# Patient Record
Sex: FEMALE | Race: WHITE | NOT HISPANIC OR LATINO | Employment: OTHER | URBAN - METROPOLITAN AREA
[De-identification: names, ages, dates, MRNs, and addresses within clinical notes are randomized per-mention and may not be internally consistent; named-entity substitution may affect disease eponyms.]

---

## 2023-04-13 LAB
ANION GAP IN SER/PLAS: 10 MMOL/L (ref 10–20)
CALCIUM (MG/DL) IN SER/PLAS: 9.4 MG/DL (ref 8.6–10.3)
CARBON DIOXIDE, TOTAL (MMOL/L) IN SER/PLAS: 28 MMOL/L (ref 21–32)
CHLORIDE (MMOL/L) IN SER/PLAS: 102 MMOL/L (ref 98–107)
CREATININE (MG/DL) IN SER/PLAS: 0.61 MG/DL (ref 0.5–1.05)
GFR FEMALE: 90 ML/MIN/1.73M2
GLUCOSE (MG/DL) IN SER/PLAS: 83 MG/DL (ref 74–99)
POTASSIUM (MMOL/L) IN SER/PLAS: 4.1 MMOL/L (ref 3.5–5.3)
SODIUM (MMOL/L) IN SER/PLAS: 136 MMOL/L (ref 136–145)
THYROTROPIN (MIU/L) IN SER/PLAS BY DETECTION LIMIT <= 0.05 MIU/L: 1.22 MIU/L (ref 0.44–3.98)
THYROXINE (T4) FREE (NG/DL) IN SER/PLAS: 1.3 NG/DL (ref 0.61–1.12)
TRIIODOTHYRONINE (T3) FREE (PG/ML) IN SER/PLAS: 3.8 PG/ML (ref 2.3–4.2)
UREA NITROGEN (MG/DL) IN SER/PLAS: 16 MG/DL (ref 6–23)

## 2023-08-16 LAB
ALANINE AMINOTRANSFERASE (SGPT) (U/L) IN SER/PLAS: 12 U/L (ref 7–45)
ALBUMIN (G/DL) IN SER/PLAS: 3.9 G/DL (ref 3.4–5)
ALKALINE PHOSPHATASE (U/L) IN SER/PLAS: 48 U/L (ref 33–136)
ANION GAP IN SER/PLAS: 10 MMOL/L (ref 10–20)
ASPARTATE AMINOTRANSFERASE (SGOT) (U/L) IN SER/PLAS: 14 U/L (ref 9–39)
BASOPHILS (10*3/UL) IN BLOOD BY AUTOMATED COUNT: 0.08 X10E9/L (ref 0–0.1)
BASOPHILS/100 LEUKOCYTES IN BLOOD BY AUTOMATED COUNT: 1.4 % (ref 0–2)
BILIRUBIN TOTAL (MG/DL) IN SER/PLAS: 1.1 MG/DL (ref 0–1.2)
CALCIUM (MG/DL) IN SER/PLAS: 9.2 MG/DL (ref 8.6–10.3)
CARBON DIOXIDE, TOTAL (MMOL/L) IN SER/PLAS: 28 MMOL/L (ref 21–32)
CHLORIDE (MMOL/L) IN SER/PLAS: 101 MMOL/L (ref 98–107)
CREATININE (MG/DL) IN SER/PLAS: 0.57 MG/DL (ref 0.5–1.05)
EOSINOPHILS (10*3/UL) IN BLOOD BY AUTOMATED COUNT: 0.16 X10E9/L (ref 0–0.4)
EOSINOPHILS/100 LEUKOCYTES IN BLOOD BY AUTOMATED COUNT: 2.7 % (ref 0–6)
ERYTHROCYTE DISTRIBUTION WIDTH (RATIO) BY AUTOMATED COUNT: 13.2 % (ref 11.5–14.5)
ERYTHROCYTE MEAN CORPUSCULAR HEMOGLOBIN CONCENTRATION (G/DL) BY AUTOMATED: 32.6 G/DL (ref 32–36)
ERYTHROCYTE MEAN CORPUSCULAR VOLUME (FL) BY AUTOMATED COUNT: 99 FL (ref 80–100)
ERYTHROCYTES (10*6/UL) IN BLOOD BY AUTOMATED COUNT: 3.92 X10E12/L (ref 4–5.2)
GFR FEMALE: >90 ML/MIN/1.73M2
GLUCOSE (MG/DL) IN SER/PLAS: 81 MG/DL (ref 74–99)
HEMATOCRIT (%) IN BLOOD BY AUTOMATED COUNT: 38.7 % (ref 36–46)
HEMOGLOBIN (G/DL) IN BLOOD: 12.6 G/DL (ref 12–16)
IMMATURE GRANULOCYTES/100 LEUKOCYTES IN BLOOD BY AUTOMATED COUNT: 0.3 % (ref 0–0.9)
LEUKOCYTES (10*3/UL) IN BLOOD BY AUTOMATED COUNT: 5.8 X10E9/L (ref 4.4–11.3)
LYMPHOCYTES (10*3/UL) IN BLOOD BY AUTOMATED COUNT: 0.6 X10E9/L (ref 0.8–3)
LYMPHOCYTES/100 LEUKOCYTES IN BLOOD BY AUTOMATED COUNT: 10.3 % (ref 13–44)
MONOCYTES (10*3/UL) IN BLOOD BY AUTOMATED COUNT: 0.74 X10E9/L (ref 0.05–0.8)
MONOCYTES/100 LEUKOCYTES IN BLOOD BY AUTOMATED COUNT: 12.7 % (ref 2–10)
NEUTROPHILS (10*3/UL) IN BLOOD BY AUTOMATED COUNT: 4.22 X10E9/L (ref 1.6–5.5)
NEUTROPHILS/100 LEUKOCYTES IN BLOOD BY AUTOMATED COUNT: 72.6 % (ref 40–80)
PLATELETS (10*3/UL) IN BLOOD AUTOMATED COUNT: 312 X10E9/L (ref 150–450)
POTASSIUM (MMOL/L) IN SER/PLAS: 4.3 MMOL/L (ref 3.5–5.3)
PROTEIN TOTAL: 6.2 G/DL (ref 6.4–8.2)
SODIUM (MMOL/L) IN SER/PLAS: 135 MMOL/L (ref 136–145)
THYROTROPIN (MIU/L) IN SER/PLAS BY DETECTION LIMIT <= 0.05 MIU/L: 1.99 MIU/L (ref 0.44–3.98)
UREA NITROGEN (MG/DL) IN SER/PLAS: 13 MG/DL (ref 6–23)

## 2023-10-26 ENCOUNTER — HOSPITAL ENCOUNTER (OUTPATIENT)
Dept: RADIOLOGY | Facility: HOSPITAL | Age: 83
Discharge: HOME | End: 2023-10-26
Payer: MEDICARE

## 2023-10-26 VITALS — WEIGHT: 133 LBS | BODY MASS INDEX: 26.81 KG/M2 | HEIGHT: 59 IN

## 2023-10-26 DIAGNOSIS — Z12.31 ENCOUNTER FOR SCREENING MAMMOGRAM FOR MALIGNANT NEOPLASM OF BREAST: ICD-10-CM

## 2023-10-26 PROCEDURE — 77067 SCR MAMMO BI INCL CAD: CPT

## 2023-10-26 PROCEDURE — 77063 BREAST TOMOSYNTHESIS BI: CPT | Performed by: RADIOLOGY

## 2023-10-26 PROCEDURE — 77067 SCR MAMMO BI INCL CAD: CPT | Performed by: RADIOLOGY

## 2023-11-28 ENCOUNTER — OFFICE VISIT (OUTPATIENT)
Dept: GASTROENTEROLOGY | Facility: CLINIC | Age: 83
End: 2023-11-28
Payer: MEDICARE

## 2023-11-28 VITALS
BODY MASS INDEX: 27.21 KG/M2 | DIASTOLIC BLOOD PRESSURE: 75 MMHG | SYSTOLIC BLOOD PRESSURE: 137 MMHG | HEIGHT: 59 IN | WEIGHT: 135 LBS | RESPIRATION RATE: 18 BRPM | TEMPERATURE: 96.9 F | OXYGEN SATURATION: 96 % | HEART RATE: 68 BPM

## 2023-11-28 DIAGNOSIS — K21.9 GASTROESOPHAGEAL REFLUX DISEASE, UNSPECIFIED WHETHER ESOPHAGITIS PRESENT: Primary | ICD-10-CM

## 2023-11-28 PROCEDURE — 1126F AMNT PAIN NOTED NONE PRSNT: CPT | Performed by: INTERNAL MEDICINE

## 2023-11-28 PROCEDURE — 99213 OFFICE O/P EST LOW 20 MIN: CPT | Performed by: INTERNAL MEDICINE

## 2023-11-28 PROCEDURE — 1159F MED LIST DOCD IN RCRD: CPT | Performed by: INTERNAL MEDICINE

## 2023-11-28 RX ORDER — LEVOTHYROXINE SODIUM 75 UG/1
88 TABLET ORAL
COMMUNITY

## 2023-11-28 RX ORDER — FAMOTIDINE 20 MG/1
20 TABLET, FILM COATED ORAL DAILY
COMMUNITY

## 2023-11-28 RX ORDER — DOCUSATE SODIUM 100 MG/1
100 CAPSULE, LIQUID FILLED ORAL AS NEEDED
COMMUNITY

## 2023-11-28 RX ORDER — TRAZODONE HYDROCHLORIDE 50 MG/1
50 TABLET ORAL NIGHTLY
COMMUNITY

## 2023-11-28 RX ORDER — ATORVASTATIN CALCIUM 10 MG/1
10 TABLET, FILM COATED ORAL DAILY
COMMUNITY

## 2023-11-28 RX ORDER — DENOSUMAB 60 MG/ML
60 INJECTION SUBCUTANEOUS
COMMUNITY

## 2023-11-28 RX ORDER — LOSARTAN POTASSIUM 25 MG/1
25 TABLET ORAL DAILY
COMMUNITY

## 2023-11-28 RX ORDER — OMEPRAZOLE 20 MG/1
20 CAPSULE, DELAYED RELEASE ORAL 2 TIMES DAILY
Qty: 180 CAPSULE | Refills: 3 | Status: SHIPPED | OUTPATIENT
Start: 2023-11-28 | End: 2024-01-09 | Stop reason: SDUPTHER

## 2023-11-28 RX ORDER — ASPIRIN 81 MG/1
81 TABLET ORAL DAILY
COMMUNITY

## 2023-11-28 RX ORDER — OMEPRAZOLE 20 MG/1
20 CAPSULE, DELAYED RELEASE ORAL
COMMUNITY
End: 2023-11-28 | Stop reason: SDUPTHER

## 2023-11-28 ASSESSMENT — ENCOUNTER SYMPTOMS
BLOATING: 0
CHILLS: 0
NAUSEA: 0
HEARTBURN: 1
DYSPNEA ON EXERTION: 0
CONSTIPATION: 0
SHORTNESS OF BREATH: 0
HEMATOCHEZIA: 0
FEVER: 0
WEIGHT LOSS: 0
ABDOMINAL PAIN: 0
DIARRHEA: 0
VOMITING: 0
COUGH: 0

## 2023-11-28 NOTE — PATIENT INSTRUCTIONS
I would like you to increase the omeprazole to 20 mg twice a day you can eventually decrease it back down to daily if your symptoms resolve.  Continue to monitor for any breakthrough heartburn symptoms.  As far as the constipation is concerned you can continue the Linzess 290 mcg tablets daily as you are doing.  I will see you in follow-up in 6 months

## 2023-11-28 NOTE — PROGRESS NOTES
REASON FOR VISIT:  Constipation and heartburn     HPI:  Christina Price is a 83 y.o. female who presents for follow up     f/u chronic constipation and GERD--doing well  has been on 290 of Linzess--moves her jaclyn 1-2 x daily -  diarrhea  if she eats too close to dosage  , occasional straining - will take colace at bedtime   Is taking omeprazole 20 mg once a day in the morning - she missed a few doses - improved with taking it again  She takes famotidine at bedtime  no alarm sx - occ epigastric pain - is trying to cut down on coffee          Med list notable for    Labs notable for    No fhx of CRC.       Prev endoscopic eval:     REVIEW OF SYSTEMS    Review of Systems   Constitutional: Negative for chills, fever and weight loss.   Cardiovascular:  Negative for chest pain and dyspnea on exertion.   Respiratory:  Negative for cough and shortness of breath.    Gastrointestinal:  Positive for heartburn. Negative for bloating, abdominal pain, constipation, diarrhea, dysphagia, hematochezia, nausea and vomiting.          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/2018    History of neurofibromatosis    Other conditions influencing health status     Arthritis    Personal history of other diseases of the circulatory system     History of essential hypertension    Personal history of other diseases of the musculoskeletal system and connective tissue     History of arthritis    Polyp of colon     Polyp of sigmoid colon    Unspecified visual loss     Vision problem       Past Surgical History:   Procedure Laterality Date    APPENDECTOMY  11/05/2013    Appendectomy    COLONOSCOPY  11/05/2013    Complete Colonoscopy    MASTECTOMY, PARTIAL  1985    Breast Surgery Partial Mastectomy    OTHER SURGICAL HISTORY  11/05/2013    Upper Gastrointestinal Endoscopy (Therapeutic)    OTHER SURGICAL HISTORY Right 1985    breast reduction     TOTAL KNEE ARTHROPLASTY  11/05/2013    Knee Replacement       No family history on file.    Social History     Tobacco Use    Smoking status: Unknown    Smokeless tobacco: Not on file   Substance Use Topics    Alcohol use: Not on file       No current outpatient medications on file.     No current facility-administered medications for this visit.       PHYSICAL EXAM:  There were no vitals taken for this visit.     Physical Exam  Constitutional:       Comments: Awake   HENT:      Head: Normocephalic.   Cardiovascular:      Rate and Rhythm: Normal rate and regular rhythm.   Pulmonary:      Effort: Pulmonary effort is normal.      Breath sounds: Normal breath sounds.   Abdominal:      General: Bowel sounds are normal.      Palpations: Abdomen is soft.   Neurological:      Mental Status: She is alert.   Psychiatric:         Mood and Affect: Mood normal.        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  She been using Linzess to 290 mcg tablets daily and has been managing her bowel movements.  She stopped taking the omeprazole due to some missed doses and noted recurrent symptoms.  She has been having more heartburn but has been using the omeprazole in the morning and famotidine at nighttime.  I will increase her omeprazole to 20 mg twice daily and she can continue to use the famotidine as needed.  I have given her samples for the Linzess due to her insurance issues getting into the donut hole.  I will see her in follow-up in 6 months        Evaluation requested by Dr. Ella Ch MD.   My final recommendations will be communicated back to the requesting physician by way of shared Medical record or letter to requesting physician via fax.      Attending Note:   I have personally performed a face to face assessment of this Patient, which included an interview, physical exam and Assessment and Plan.  I have reviewed and confirmed the  history and key findings as documented by the Medical Assistant and edited as appropriate.     Signature: Nina Mccauley MD    Date: 11/28/2023  Time: 8:01 AM

## 2023-12-26 ENCOUNTER — HOSPITAL ENCOUNTER (OUTPATIENT)
Dept: RADIOLOGY | Facility: HOSPITAL | Age: 83
Discharge: HOME | End: 2023-12-26
Payer: MEDICARE

## 2023-12-26 DIAGNOSIS — R91.8 OTHER NONSPECIFIC ABNORMAL FINDING OF LUNG FIELD: ICD-10-CM

## 2023-12-26 PROCEDURE — 71250 CT THORAX DX C-: CPT

## 2023-12-26 PROCEDURE — 71250 CT THORAX DX C-: CPT | Performed by: RADIOLOGY

## 2024-01-09 DIAGNOSIS — K21.9 GASTROESOPHAGEAL REFLUX DISEASE, UNSPECIFIED WHETHER ESOPHAGITIS PRESENT: ICD-10-CM

## 2024-01-09 RX ORDER — OMEPRAZOLE 20 MG/1
20 CAPSULE, DELAYED RELEASE ORAL 2 TIMES DAILY
Qty: 180 CAPSULE | Refills: 3 | Status: SHIPPED | OUTPATIENT
Start: 2024-01-09

## 2024-01-25 ENCOUNTER — TELEPHONE (OUTPATIENT)
Dept: GASTROENTEROLOGY | Facility: CLINIC | Age: 84
End: 2024-01-25
Payer: MEDICARE

## 2024-01-25 DIAGNOSIS — K59.09 CHRONIC CONSTIPATION: Primary | ICD-10-CM

## 2024-01-25 DIAGNOSIS — K59.09 CHRONIC CONSTIPATION: ICD-10-CM

## 2024-01-25 NOTE — PROGRESS NOTES
Refilled patient's prescription for Linzess 290 mcg-quantity of 90 with 3 refills printed and will fax to the patient's pharmacy.

## 2024-01-25 NOTE — TELEPHONE ENCOUNTER
Ruperto Hernandez,     Patient will need this script printed and faxed to [facility]. If you can sign off on it I will see if Jade can fax it.

## 2024-02-01 DIAGNOSIS — K59.09 CHRONIC CONSTIPATION: ICD-10-CM

## 2024-02-01 NOTE — TELEPHONE ENCOUNTER
Patient's son called today (2/1/24) asking that Rx for Linzess be faxed to [facility]:  [phone]

## 2024-05-03 ENCOUNTER — LAB (OUTPATIENT)
Dept: LAB | Facility: LAB | Age: 84
End: 2024-05-03
Payer: COMMERCIAL

## 2024-05-03 DIAGNOSIS — E78.2 MIXED HYPERLIPIDEMIA: ICD-10-CM

## 2024-05-03 DIAGNOSIS — I10 ESSENTIAL (PRIMARY) HYPERTENSION: ICD-10-CM

## 2024-05-03 DIAGNOSIS — E03.9 HYPOTHYROIDISM, UNSPECIFIED: ICD-10-CM

## 2024-05-03 DIAGNOSIS — I10 ESSENTIAL (PRIMARY) HYPERTENSION: Primary | ICD-10-CM

## 2024-05-03 LAB
ANION GAP SERPL CALC-SCNC: 12 MMOL/L (ref 10–20)
BASOPHILS # BLD AUTO: 0.09 X10*3/UL (ref 0–0.1)
BASOPHILS NFR BLD AUTO: 1.7 %
BUN SERPL-MCNC: 13 MG/DL (ref 6–23)
CALCIUM SERPL-MCNC: 9.4 MG/DL (ref 8.6–10.6)
CHLORIDE SERPL-SCNC: 103 MMOL/L (ref 98–107)
CHOLEST SERPL-MCNC: 150 MG/DL (ref 0–199)
CHOLESTEROL/HDL RATIO: 2.4
CO2 SERPL-SCNC: 27 MMOL/L (ref 21–32)
CREAT SERPL-MCNC: 0.49 MG/DL (ref 0.5–1.05)
EGFRCR SERPLBLD CKD-EPI 2021: >90 ML/MIN/1.73M*2
EOSINOPHIL # BLD AUTO: 0.33 X10*3/UL (ref 0–0.4)
EOSINOPHIL NFR BLD AUTO: 6.3 %
ERYTHROCYTE [DISTWIDTH] IN BLOOD BY AUTOMATED COUNT: 12.7 % (ref 11.5–14.5)
GLUCOSE SERPL-MCNC: 80 MG/DL (ref 74–99)
HCT VFR BLD AUTO: 39.2 % (ref 36–46)
HDLC SERPL-MCNC: 62 MG/DL
HGB BLD-MCNC: 12.9 G/DL (ref 12–16)
IMM GRANULOCYTES # BLD AUTO: 0.02 X10*3/UL (ref 0–0.5)
IMM GRANULOCYTES NFR BLD AUTO: 0.4 % (ref 0–0.9)
LDLC SERPL CALC-MCNC: 77 MG/DL
LYMPHOCYTES # BLD AUTO: 0.79 X10*3/UL (ref 0.8–3)
LYMPHOCYTES NFR BLD AUTO: 15 %
MCH RBC QN AUTO: 31.5 PG (ref 26–34)
MCHC RBC AUTO-ENTMCNC: 32.9 G/DL (ref 32–36)
MCV RBC AUTO: 96 FL (ref 80–100)
MONOCYTES # BLD AUTO: 0.6 X10*3/UL (ref 0.05–0.8)
MONOCYTES NFR BLD AUTO: 11.4 %
NEUTROPHILS # BLD AUTO: 3.42 X10*3/UL (ref 1.6–5.5)
NEUTROPHILS NFR BLD AUTO: 65.2 %
NON HDL CHOLESTEROL: 88 MG/DL (ref 0–149)
NRBC BLD-RTO: 0 /100 WBCS (ref 0–0)
PLATELET # BLD AUTO: 327 X10*3/UL (ref 150–450)
POTASSIUM SERPL-SCNC: 4.3 MMOL/L (ref 3.5–5.3)
RBC # BLD AUTO: 4.1 X10*6/UL (ref 4–5.2)
SODIUM SERPL-SCNC: 138 MMOL/L (ref 136–145)
T4 FREE SERPL-MCNC: 1.49 NG/DL (ref 0.78–1.48)
TRIGL SERPL-MCNC: 55 MG/DL (ref 0–149)
TSH SERPL-ACNC: 1.38 MIU/L (ref 0.44–3.98)
VLDL: 11 MG/DL (ref 0–40)
WBC # BLD AUTO: 5.3 X10*3/UL (ref 4.4–11.3)

## 2024-05-03 PROCEDURE — 84443 ASSAY THYROID STIM HORMONE: CPT

## 2024-05-03 PROCEDURE — 84439 ASSAY OF FREE THYROXINE: CPT

## 2024-05-03 PROCEDURE — 85025 COMPLETE CBC W/AUTO DIFF WBC: CPT

## 2024-05-03 PROCEDURE — 36415 COLL VENOUS BLD VENIPUNCTURE: CPT

## 2024-05-03 PROCEDURE — 80061 LIPID PANEL: CPT

## 2024-05-03 PROCEDURE — 80048 BASIC METABOLIC PNL TOTAL CA: CPT

## 2024-05-13 PROBLEM — K21.9 ESOPHAGEAL REFLUX: Status: ACTIVE | Noted: 2024-05-13

## 2024-05-13 PROBLEM — K59.09 CHRONIC CONSTIPATION: Status: ACTIVE | Noted: 2024-05-13

## 2024-05-13 PROBLEM — I70.0 CALCIFICATION OF ABDOMINAL AORTA (CMS-HCC): Status: ACTIVE | Noted: 2024-05-13

## 2024-05-13 PROBLEM — J34.89 NASAL VESTIBULITIS: Status: ACTIVE | Noted: 2024-05-13

## 2024-05-13 PROBLEM — J02.9 SORE THROAT: Status: ACTIVE | Noted: 2024-05-13

## 2024-05-13 PROBLEM — S43.004A DISLOCATION OF RIGHT SHOULDER JOINT: Status: ACTIVE | Noted: 2024-05-13

## 2024-05-13 PROBLEM — M06.9 RHEUMATOID ARTHRITIS (MULTI): Status: ACTIVE | Noted: 2024-05-13

## 2024-05-13 PROBLEM — R59.9 ADENOPATHY: Status: ACTIVE | Noted: 2024-05-13

## 2024-05-13 PROBLEM — M79.603 ARM PAIN: Status: ACTIVE | Noted: 2024-05-13

## 2024-05-13 PROBLEM — Z86.79 HISTORY OF HYPERTENSION: Status: ACTIVE | Noted: 2024-05-13

## 2024-05-13 PROBLEM — M25.579 ANKLE PAIN: Status: ACTIVE | Noted: 2024-05-13

## 2024-05-13 PROBLEM — R91.8 LUNG FIELD ABNORMAL: Status: ACTIVE | Noted: 2022-12-14

## 2024-05-13 PROBLEM — R05.9 COUGH: Status: ACTIVE | Noted: 2024-05-13

## 2024-05-13 PROBLEM — R00.2 PALPITATIONS: Status: ACTIVE | Noted: 2024-05-13

## 2024-05-13 PROBLEM — D23.9 OTHER BENIGN NEOPLASM OF SKIN, UNSPECIFIED: Status: ACTIVE | Noted: 2021-11-15

## 2024-05-13 PROBLEM — R07.9 ACUTE CHEST PAIN: Status: ACTIVE | Noted: 2024-05-13

## 2024-05-13 PROBLEM — M19.079 OSTEOARTHRITIS OF ANKLE: Status: ACTIVE | Noted: 2024-05-13

## 2024-05-13 PROBLEM — J02.9 VIRAL PHARYNGITIS: Status: ACTIVE | Noted: 2024-05-13

## 2024-05-13 PROBLEM — M17.11 OSTEOARTHRITIS OF RIGHT KNEE: Status: ACTIVE | Noted: 2024-05-13

## 2024-05-13 PROBLEM — R12 HEARTBURN: Status: ACTIVE | Noted: 2024-05-13

## 2024-05-13 PROBLEM — E03.9 HYPOTHYROIDISM: Status: ACTIVE | Noted: 2024-05-13

## 2024-05-13 PROBLEM — R91.8 LUNG NODULES: Status: ACTIVE | Noted: 2018-05-24

## 2024-05-13 PROBLEM — H69.90 EUSTACHIAN TUBE DYSFUNCTION: Status: ACTIVE | Noted: 2024-05-13

## 2024-05-13 PROBLEM — K63.5 POLYP OF SIGMOID COLON: Status: ACTIVE | Noted: 2024-05-13

## 2024-05-13 PROBLEM — M79.89 SWELLING OF ARM: Status: ACTIVE | Noted: 2024-05-13

## 2024-05-13 PROBLEM — J06.9 URI (UPPER RESPIRATORY INFECTION): Status: ACTIVE | Noted: 2024-05-13

## 2024-05-13 PROBLEM — J02.9 ACUTE PHARYNGITIS: Status: ACTIVE | Noted: 2024-05-13

## 2024-05-13 PROBLEM — H53.9 VISION DISORDER: Status: ACTIVE | Noted: 2024-05-13

## 2024-05-13 PROBLEM — M67.441 GANGLION OF HAND, RIGHT: Status: ACTIVE | Noted: 2024-05-13

## 2024-05-13 PROBLEM — L72.0 EPIDERMAL CYST: Status: ACTIVE | Noted: 2021-11-15

## 2024-05-13 PROBLEM — M54.50 LOW BACK PAIN: Status: ACTIVE | Noted: 2024-05-13

## 2024-05-13 PROBLEM — L21.8 OTHER SEBORRHEIC DERMATITIS: Status: ACTIVE | Noted: 2021-11-15

## 2024-05-13 PROBLEM — Z98.890 HISTORY OF REDUCTION SURGERY OF RIGHT BREAST: Status: ACTIVE | Noted: 2017-06-26

## 2024-05-13 PROBLEM — Z90.12 HISTORY OF LEFT MASTECTOMY: Status: ACTIVE | Noted: 2024-05-13

## 2024-05-24 NOTE — PROGRESS NOTES
REASON FOR VISIT:  Constipation/ GERD    HPI:  Christina Price is a 83 y.o. female who presents for follow up   For chronic constipation and GERD--doing well  has been on 290 of Linzess--moves her jaclyn 2 x daily - will take fiber gummies and colace   Is taking omeprazole 20 mg once a day in the morning  She takes famotidine at bedtime - has breakthrough once a week - she has belching a few times a week - mostly after coffee  no alarm sx - occ epigastric pain     Med list notable for famotidine , linzess 290mcg, omeprazole     Labs notable for    No fhx of CRC.       Prev endoscopic eval:   >> Colonoscopy 06/2019  Impression:            - Diverticulosis in the sigmoid colon.                         - External hemorrhoids.                         - One 2 mm polyp at the hepatic flexure, removed with                          a cold biopsy forceps. Resected and retrieved.  A.  HEPATIC FLEXURE POLYP, POLYPECTOMY:    --TUBULAR ADENOMA.       REVIEW OF SYSTEMS    Review of Systems   Constitutional: Negative for weight loss.   Gastrointestinal:  Negative for bloating, abdominal pain, constipation, diarrhea, dysphagia, heartburn, melena, nausea and vomiting.          Allergies   Allergen Reactions    Restasis [Cyclosporine] Itching       Past Medical History:   Diagnosis Date    Breast cancer (Multi) 1985    Left breast    Neurofibromatosis, unspecified (Multi) 12/18/2018    History of neurofibromatosis    Other conditions influencing health status     Arthritis    Personal history of other diseases of the circulatory system     History of essential hypertension    Personal history of other diseases of the musculoskeletal system and connective tissue     History of arthritis    Polyp of colon     Polyp of sigmoid colon    Unspecified visual loss     Vision problem       Past Surgical History:   Procedure Laterality Date    APPENDECTOMY  11/05/2013    Appendectomy    COLONOSCOPY  11/05/2013    Complete Colonoscopy     MASTECTOMY, PARTIAL  1985    Breast Surgery Partial Mastectomy    OTHER SURGICAL HISTORY  11/05/2013    Upper Gastrointestinal Endoscopy (Therapeutic)    OTHER SURGICAL HISTORY Right 1985    breast reduction    TOTAL KNEE ARTHROPLASTY  11/05/2013    Knee Replacement       No family history on file.    Social History     Tobacco Use    Smoking status: Unknown    Smokeless tobacco: Not on file   Substance Use Topics    Alcohol use: Not on file       Current Outpatient Medications   Medication Sig Dispense Refill    aspirin 81 mg EC tablet Take 1 tablet (81 mg) by mouth once daily.      atorvastatin (Lipitor) 10 mg tablet Take 1 tablet (10 mg) by mouth once daily.      denosumab (Prolia) 60 mg/mL syringe Inject 1 mL (60 mg total) under the skin every 6 months.      docusate sodium (Colace) 100 mg capsule Take 1 capsule (100 mg) by mouth if needed for constipation.      famotidine (Pepcid) 20 mg tablet Take 1 tablet (20 mg) by mouth once daily.      levothyroxine (Synthroid, Levoxyl) 75 mcg tablet Take 88 mcg by mouth once daily in the morning. Take before meals.      linaCLOtide (Linzess) 290 mcg capsule Take 1 capsule (290 mcg) by mouth once daily in the morning. Take before meals. Do not crush or chew.      linaCLOtide (Linzess) 290 mcg capsule Take 1 capsule (290 mcg) by mouth once daily in the morning. Take before meals. Do not crush or chew. 90 capsule 3    losartan (Cozaar) 25 mg tablet Take 1 tablet (25 mg) by mouth once daily.      omeprazole (PriLOSEC) 20 mg DR capsule Take 1 capsule (20 mg) by mouth 2 times a day. Do not crush or chew. 180 capsule 3    traZODone (Desyrel) 50 mg tablet Take 1 tablet (50 mg) by mouth once daily at bedtime.       No current facility-administered medications for this visit.       PHYSICAL EXAM:  There were no vitals taken for this visit.     Physical Exam  Constitutional:       Comments: Awake   HENT:      Head: Normocephalic.   Cardiovascular:      Rate and Rhythm: Normal rate  and regular rhythm.   Pulmonary:      Effort: Pulmonary effort is normal.      Breath sounds: Normal breath sounds.   Abdominal:      General: Bowel sounds are normal.      Palpations: Abdomen is soft.   Neurological:      Mental Status: She is alert.   Psychiatric:         Mood and Affect: Mood normal.        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  She continues to take Linzess 290 mcg tablets daily and has been managing her bowel movements.  She is back on omeprazole for her heartburn symptoms in the morning and takes famotidine at night.  She reports rare breakthrough symptoms about once a week.  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  She also has a history of a 2 mm tubular adenoma in 2019 and is due for surveillance.  We had a long discussion about potential risks of the procedure versus colon cancer.  She would like to proceed with a surveillance colonoscopy.  I will send Suprep to her pharmacy since she has normal kidney function.  I will see her in follow-up in 6 months      Evaluation requested by Dr. Ella Ch MD.   My final recommendations will be communicated back to the requesting physician by way of shared Medical record or letter to requesting physician via fax.      Attending Note:   I have personally performed a face to face assessment of this Patient, which included an interview, physical exam and Assessment and Plan.  I have reviewed and confirmed the history and key findings as documented by the Medical Assistant and edited as appropriate.     Signature: Nina Mccauley MD    Date: 5/24/2024  Time: 2:25 PM

## 2024-05-28 ENCOUNTER — OFFICE VISIT (OUTPATIENT)
Dept: GASTROENTEROLOGY | Facility: CLINIC | Age: 84
End: 2024-05-28
Payer: COMMERCIAL

## 2024-05-28 VITALS
DIASTOLIC BLOOD PRESSURE: 65 MMHG | TEMPERATURE: 97.5 F | HEIGHT: 59 IN | BODY MASS INDEX: 27.01 KG/M2 | HEART RATE: 62 BPM | RESPIRATION RATE: 18 BRPM | SYSTOLIC BLOOD PRESSURE: 129 MMHG | WEIGHT: 134 LBS

## 2024-05-28 DIAGNOSIS — K21.9 GASTROESOPHAGEAL REFLUX DISEASE WITHOUT ESOPHAGITIS: Primary | ICD-10-CM

## 2024-05-28 DIAGNOSIS — K59.09 CHRONIC CONSTIPATION: ICD-10-CM

## 2024-05-28 DIAGNOSIS — D12.5 ADENOMATOUS POLYP OF SIGMOID COLON: ICD-10-CM

## 2024-05-28 PROCEDURE — 99214 OFFICE O/P EST MOD 30 MIN: CPT | Performed by: INTERNAL MEDICINE

## 2024-05-28 PROCEDURE — 1036F TOBACCO NON-USER: CPT | Performed by: INTERNAL MEDICINE

## 2024-05-28 PROCEDURE — 1159F MED LIST DOCD IN RCRD: CPT | Performed by: INTERNAL MEDICINE

## 2024-05-28 RX ORDER — SODIUM, POTASSIUM,MAG SULFATES 17.5-3.13G
1 SOLUTION, RECONSTITUTED, ORAL ORAL 2 TIMES DAILY
Qty: 1 EACH | Refills: 0 | Status: SHIPPED | OUTPATIENT
Start: 2024-05-28 | End: 2024-05-29

## 2024-05-28 ASSESSMENT — ENCOUNTER SYMPTOMS
ABDOMINAL PAIN: 0
BLOATING: 0
CONSTIPATION: 0
HEARTBURN: 0
VOMITING: 0
NAUSEA: 0
DIARRHEA: 0
WEIGHT LOSS: 0

## 2024-06-05 ENCOUNTER — OFFICE VISIT (OUTPATIENT)
Dept: OTOLARYNGOLOGY | Facility: CLINIC | Age: 84
End: 2024-06-05
Payer: COMMERCIAL

## 2024-06-05 DIAGNOSIS — J34.89 RHINORRHEA: ICD-10-CM

## 2024-06-05 DIAGNOSIS — J34.89 NASAL PAIN: ICD-10-CM

## 2024-06-05 DIAGNOSIS — R44.8 FACIAL PRESSURE: ICD-10-CM

## 2024-06-05 DIAGNOSIS — J34.2 DEVIATED NASAL SEPTUM: Primary | ICD-10-CM

## 2024-06-05 PROCEDURE — 1159F MED LIST DOCD IN RCRD: CPT | Performed by: OTOLARYNGOLOGY

## 2024-06-05 PROCEDURE — 31231 NASAL ENDOSCOPY DX: CPT | Performed by: OTOLARYNGOLOGY

## 2024-06-05 PROCEDURE — 99203 OFFICE O/P NEW LOW 30 MIN: CPT | Performed by: OTOLARYNGOLOGY

## 2024-06-05 PROCEDURE — 1125F AMNT PAIN NOTED PAIN PRSNT: CPT | Performed by: OTOLARYNGOLOGY

## 2024-06-05 ASSESSMENT — PAIN SCALES - GENERAL: PAINLEVEL: 7

## 2024-06-05 NOTE — PROGRESS NOTES
Chief Complaint:  Multiple sinonasal concerns    History Of Present Illness:  Reason For Visit:     Christina presents as a new patient self-referred visit for multiple sinus symptoms.  Her initial concern today was intermittent nasal pain.  This has been present for more than 5 years.  She will feel discomfort inside of her nostrils that is present more than 50% of the time.  She feels that her nose often feels raw and irritated and she will occasionally get sores within her external valves.  She treats this with saline gel in tube form.  She has been told multiple times by her primary care provider that she is red and irritated inside of her nostrils.  She mentioned that a family member had head and neck cancer and wanted to make sure that this issue was not concerning.    Main Symptoms:  Patient has anterior nasal drainage.     Patient has  posterior nasal drainage.    Patient does not have nasal airway obstruction.   Patient has  facial pain.   over forehead, <50% of the time   Patient has  facial pressure.    Patient does not have decreased sense of smell.   Associated Symptoms:   Patient does not have  headaches.    Patient does not have throat clearing.    Patient does not have coughing.    Patient does not have dysphonia.   Patient does not have nasal bleeding.     Medications currently on for sinonasal symptoms: Ayr gel, Flonase 2 puffs each side as needed   Medications tried in the past for sinonasal symptoms:   OTC Decongestant    Other Pertinent Medical Conditions:   Patient does not have asthma.    Patient does not have aspirin sensitivity.    Patient does not have migraines.    Patient has history of allergy testing. When: many years ago, unsure of results. Patient does not have history of IT.   Patient does not have history of sinus surgery.    Patient does not have history of nasal fracture.    Patient has heartburn.    The patient does take medical therapy for heartburn.  Omeprazole.  The patient does  not have imaging of sinuses.     Active Problems:  Patient Active Problem List   Diagnosis    Acute chest pain    Acute pharyngitis    Ankle pain    Arm pain    Calcification of abdominal aorta (CMS-HCC)    Chronic constipation    Cough    Dislocation of right shoulder joint    Epidermal cyst    Esophageal reflux    Eustachian tube dysfunction    Ganglion of hand, right    Heartburn    History of hypertension    History of left mastectomy    History of malignant neoplasm of breast    History of reduction surgery of right breast    Hypothyroidism    Bilateral inguinal hernia    Low back pain    Lump or mass in breast    Lung field abnormal    Lung nodules    Adenopathy    Nasal vestibulitis    Osteoarthritis of ankle    Osteoarthritis of right knee    Osteoporosis    Other benign neoplasm of skin, unspecified    Other seborrheic dermatitis    Palpitations    Polyp of sigmoid colon    Localized osteoarthrosis    Rheumatoid arthritis (Multi)    S/P total abdominal hysterectomy and bilateral salpingo-oophorectomy    Sore throat    Swelling of arm    Synovitis and tenosynovitis    URI (upper respiratory infection)    Viral pharyngitis    Vision disorder      Past Medical History:  She has a past medical history of Breast cancer (Multi) (1985), Neurofibromatosis, unspecified (Multi) (12/18/2018), Other conditions influencing health status, Personal history of other diseases of the circulatory system, Personal history of other diseases of the musculoskeletal system and connective tissue, Polyp of colon, and Unspecified visual loss.    Surgical History:  She has a past surgical history that includes Total knee arthroplasty (11/05/2013); Mastectomy, partial (1985); Appendectomy (11/05/2013); Other surgical history (11/05/2013); Colonoscopy (11/05/2013); and Other surgical history (Right, 1985).     Family History:  No family history on file.    Social History:  She reports that she has never smoked. She has never used  smokeless tobacco. She reports that she does not currently use alcohol. She reports that she does not currently use drugs.     Allergies:  Restasis [cyclosporine]    Current Meds:  Current Outpatient Medications:     aspirin 81 mg EC tablet, Take 1 tablet (81 mg) by mouth once daily., Disp: , Rfl:     atorvastatin (Lipitor) 10 mg tablet, Take 1 tablet (10 mg) by mouth once daily., Disp: , Rfl:     denosumab (Prolia) 60 mg/mL syringe, Inject 1 mL (60 mg) under the skin every 6 months., Disp: , Rfl:     docusate sodium (Colace) 100 mg capsule, Take 1 capsule (100 mg) by mouth if needed for constipation., Disp: , Rfl:     famotidine (Pepcid) 20 mg tablet, Take 1 tablet (20 mg) by mouth once daily., Disp: , Rfl:     levothyroxine (Synthroid, Levoxyl) 75 mcg tablet, Take 88 mcg by mouth once daily in the morning. Take before meals., Disp: , Rfl:     linaCLOtide (Linzess) 290 mcg capsule, Take 1 capsule (290 mcg) by mouth once daily in the morning. Take before meals. Do not crush or chew., Disp: , Rfl:     linaCLOtide (Linzess) 290 mcg capsule, Take 1 capsule (290 mcg) by mouth once daily in the morning. Take before meals. Do not crush or chew., Disp: 90 capsule, Rfl: 3    losartan (Cozaar) 25 mg tablet, Take 1 tablet (25 mg) by mouth once daily., Disp: , Rfl:     omeprazole (PriLOSEC) 20 mg DR capsule, Take 1 capsule (20 mg) by mouth 2 times a day. Do not crush or chew., Disp: 180 capsule, Rfl: 3    traZODone (Desyrel) 50 mg tablet, Take 1 tablet (50 mg) by mouth once daily at bedtime., Disp: , Rfl:     Vitals:  Visit Vitals  OB Status Postmenopausal   Smoking Status Never      Physical Exam:  CONSTITUTIONAL: Vitals reviewed in nursing chart, well developed, well nourished.   RESPIRATION: Breathing comfortably, no stridor.  CV: No clubbing/cyanosis/edema in hands.  EYES: EOM Intact, sclera normal.  NEURO: Alert and oriented times 3, Cranial nerves 2-12 intact and symmetric bilaterally.  HEAD AND FACE: Skin with no masses  or lesions, sinuses nontender to palpation.  SALIVARY GLANDS: Parotid and submandibular glands normal bilaterally.  EARS: Normal external ears, external auditory canals, and TMs to otoscopy, normal hearing to whispered voice.  NOSE: External nose midline, anterior rhinoscopy is normal with limited visualization to the anterior aspect of the interior turbinates (see nasal endoscopy).  ORAL CAVITY/OROPHARYNX/LIPS: Normal mucous membranes, normal floor of mouth/tongue/OP, no masses or lesions are noted.  NECK/LYMPH: No LAD, no thyroid masses.    SINONASAL ENDOSCOPY (CPT 15610): To better evaluate the patient's symptoms, sinonasal endoscopy is indicated.  After discussion of risks and benefits, and topical decongestion and anesthesia,an endoscope was used to perform nasal endoscopy on each side.  A time out identifying the patient, the procedure, the location of the procedure and any concerns was performed prior to beginning the procedure.    Findings:  Examination of the right nasal cavity demonstrated a normal middle meatus and sphenoethmoid recess without pus or polyps.  She has a septal deviation to the right.  There were no concerning nasal cavity lesions.  Examination of the left nasal cavity revealed a normal middle meatus and sphenoethmoid recess without pus or polyps.  No concerning nasopharyngeal lesions.    Provider Impressions:  1.  Rhinorrhea  2.  Facial pain / pressure, nasal pain  3.  Heartburn on proton pump inhibitor   4.  Deviated nasal septum   5.  Neurofibromatosis     Discussion:  Christina Price and I discussed her exam and symptoms.  I reassured her that I did not see anything concerning on examination today.  In regard to the nasal dryness and irritation, I recommended that she utilize saline gel in spray form.  We discussed some advantages of this over gel in tube form.  We also discussed how to obtain this over-the-counter.    I also recommended discontinuation of fluticasone as this can cause  irritation.  Given that the irritation was her primary complaint we would need to balance any benefit that she was getting with the fluticasone versus improvement in this issue.  Certainly if her other symptoms worsen, there are alternative topical steroids that she could try including rinse based steroids.    I recommended follow-up with me in 3 months.  She was amenable to this and all questions were answered.    Patient Discussion/Summary:  Welcome to Dr. Gurpreet Guerrero's clinic. We are here to assist you with your ENT needs at [facility]. Dr. Guerrero is an ENT that specializes in nose, sinus, and skull base disorders.    Dr. Gurpreet Guerrero's office number is [phone]. Please call this number to contact his care team regardless of which office you use to access care. This number is the most direct way to communicate with all the members of the care team.    Shayna Garcia CNP is a nurse practitioner who is a part of Dr. Guerrero's team. She will work collaboratively with Dr. Guerrero to meet your goals. This often may include seeing you for more urgent appointments or follow-up visits under Dr. Guerrero's supervision.    Tigist Mcdonald RN BSN is Dr. Guerrero's primary nurse. She can be reached by calling [phone]. Tigist is available during business hours Tuesday through Friday. Shayna Sanchez RN BSN is her rhinology nurse partner. Shayna is available during business hours Monday through Thursday. Messages left for them will be returned within one business day. Tigist is also Dr. Guerrero's surgery scheduler and will assist you with planning and scheduling of your surgery during her office hours.     Lynne Armstrong is Dr. Guerrero's  and you can reach her at [phone]. She can help you with scheduling of appointments, general questions and information. She is available to receive calls Monday through Friday from 8:00 am until 4:25 pm.     For your  convenience, Dr. Guerrero sees patients at [facility] and [facility] at [facility]. While we try to make your appointments as convenient as possible, occasionally a visit to another location may be necessary to provide the best care for you.    Dr. Guerrero makes every effort to run on time for your appointments. Therefore, if you are more than 25 minutes late, your appointment will need to be rescheduled to another day. We appreciate your understanding.     We look forward to working with you to meet your healthcare goals.     Signature:  Scribe Attestation  By signing my name below, I, Kevin Cassidy   attest that this documentation has been prepared under the direction and in the presence of Gurpreet Guerrero MD.

## 2024-08-18 ENCOUNTER — ANESTHESIA EVENT (OUTPATIENT)
Dept: GASTROENTEROLOGY | Facility: EXTERNAL LOCATION | Age: 84
End: 2024-08-18

## 2024-08-29 ENCOUNTER — APPOINTMENT (OUTPATIENT)
Dept: GASTROENTEROLOGY | Facility: EXTERNAL LOCATION | Age: 84
End: 2024-08-29
Payer: COMMERCIAL

## 2024-08-29 ENCOUNTER — ANESTHESIA (OUTPATIENT)
Dept: GASTROENTEROLOGY | Facility: EXTERNAL LOCATION | Age: 84
End: 2024-08-29

## 2024-08-29 VITALS
RESPIRATION RATE: 15 BRPM | BODY MASS INDEX: 27.21 KG/M2 | TEMPERATURE: 96.8 F | HEART RATE: 60 BPM | SYSTOLIC BLOOD PRESSURE: 131 MMHG | HEIGHT: 59 IN | OXYGEN SATURATION: 98 % | WEIGHT: 135 LBS | DIASTOLIC BLOOD PRESSURE: 68 MMHG

## 2024-08-29 DIAGNOSIS — D12.5 ADENOMATOUS POLYP OF SIGMOID COLON: ICD-10-CM

## 2024-08-29 PROCEDURE — 88305 TISSUE EXAM BY PATHOLOGIST: CPT | Mod: TC,ELYLAB | Performed by: INTERNAL MEDICINE

## 2024-08-29 PROCEDURE — 45385 COLONOSCOPY W/LESION REMOVAL: CPT | Performed by: INTERNAL MEDICINE

## 2024-08-29 PROCEDURE — 45380 COLONOSCOPY AND BIOPSY: CPT | Performed by: INTERNAL MEDICINE

## 2024-08-29 RX ORDER — SODIUM CHLORIDE 9 MG/ML
20 INJECTION, SOLUTION INTRAVENOUS CONTINUOUS
Status: DISCONTINUED | OUTPATIENT
Start: 2024-08-29 | End: 2024-08-30 | Stop reason: HOSPADM

## 2024-08-29 RX ORDER — ONDANSETRON HYDROCHLORIDE 2 MG/ML
4 INJECTION, SOLUTION INTRAVENOUS ONCE AS NEEDED
Status: DISCONTINUED | OUTPATIENT
Start: 2024-08-29 | End: 2024-08-30 | Stop reason: HOSPADM

## 2024-08-29 RX ORDER — LIDOCAINE HYDROCHLORIDE 20 MG/ML
INJECTION, SOLUTION INFILTRATION; PERINEURAL AS NEEDED
Status: DISCONTINUED | OUTPATIENT
Start: 2024-08-29 | End: 2024-08-29

## 2024-08-29 RX ORDER — PROPOFOL 10 MG/ML
INJECTION, EMULSION INTRAVENOUS AS NEEDED
Status: DISCONTINUED | OUTPATIENT
Start: 2024-08-29 | End: 2024-08-29

## 2024-08-29 SDOH — HEALTH STABILITY: MENTAL HEALTH: CURRENT SMOKER: 0

## 2024-08-29 ASSESSMENT — PAIN - FUNCTIONAL ASSESSMENT
PAIN_FUNCTIONAL_ASSESSMENT: 0-10

## 2024-08-29 ASSESSMENT — PAIN SCALES - GENERAL
PAINLEVEL_OUTOF10: 0 - NO PAIN
PAIN_LEVEL: 0
PAINLEVEL_OUTOF10: 0 - NO PAIN

## 2024-08-29 ASSESSMENT — COLUMBIA-SUICIDE SEVERITY RATING SCALE - C-SSRS
6. HAVE YOU EVER DONE ANYTHING, STARTED TO DO ANYTHING, OR PREPARED TO DO ANYTHING TO END YOUR LIFE?: NO
1. IN THE PAST MONTH, HAVE YOU WISHED YOU WERE DEAD OR WISHED YOU COULD GO TO SLEEP AND NOT WAKE UP?: NO
2. HAVE YOU ACTUALLY HAD ANY THOUGHTS OF KILLING YOURSELF?: NO

## 2024-08-29 NOTE — ANESTHESIA POSTPROCEDURE EVALUATION
Patient: Christina Price    Procedure Summary       Date: 08/29/24 Room / Location: [facility]    Anesthesia Start: 1211 Anesthesia Stop:     Procedure: COLONOSCOPY Diagnosis: Adenomatous polyp of sigmoid colon    Scheduled Providers: Nina NAM MD Responsible Provider: IZZY Ac    Anesthesia Type: MAC ASA Status: 2            Anesthesia Type: MAC    Vitals Value Taken Time   /62 08/29/24 1233   Temp 36.0 08/29/24 1233   Pulse 64 08/29/24 1233   Resp 20 08/29/24 1233   SpO2 97 08/29/24 1233       Anesthesia Post Evaluation    Patient location during evaluation: bedside  Patient participation: complete - patient participated  Level of consciousness: awake  Pain score: 0  Pain management: adequate  Airway patency: patent  Cardiovascular status: acceptable  Respiratory status: acceptable  Hydration status: acceptable  Postoperative Nausea and Vomiting: none        There were no known notable events for this encounter.

## 2024-08-29 NOTE — ANESTHESIA PREPROCEDURE EVALUATION
Patient: Christina Price    Procedure Information       Date/Time: 08/29/24 1230    Scheduled providers: Nina NAM MD    Procedure: COLONOSCOPY    Location: [facility]            Relevant Problems   Anesthesia (within normal limits)      Cardiac   (+) Acute chest pain      Pulmonary   (+) Lung nodules      Neuro (within normal limits)      GI   (+) Esophageal reflux      /Renal (within normal limits)      Liver (within normal limits)      Endocrine   (+) Hypothyroidism      Hematology (within normal limits)      Musculoskeletal   (+) Localized osteoarthrosis   (+) Osteoarthritis of ankle   (+) Osteoarthritis of right knee   (+) Rheumatoid arthritis (Multi)      HEENT (within normal limits)      ID   (+) URI (upper respiratory infection)   (+) Viral pharyngitis      Skin (within normal limits)      GYN (within normal limits)       Clinical information reviewed:    Allergies  Meds               NPO Detail:  NPO/Void Status  Date of Last Liquid: 08/29/24  Time of Last Liquid: 0600  Date of Last Solid: 08/27/24  Last Intake Type: Clear fluids         PHYSICAL EXAM    Anesthesia Plan    History of general anesthesia?: yes  History of complications of general anesthesia?: no    ASA 2     MAC     The patient is not a current smoker.    intravenous induction   Anesthetic plan and risks discussed with patient.    Plan discussed with CRNA.

## 2024-08-29 NOTE — H&P
Outpatient Hospital Procedure    Patient Profile-Procedures  Initial Info  Patient Demographics  Name Christina Price  Date of Birth 1940  MRN [id number]  Address   [address] [facility] [phone] [facility] [address]    Primary Phone Number [phone]  Secondary Phone Number    Ella Sosa    Procedures   Colonoscopy      Indication:  Surveillance - TA 2019    Primary contact name and number   Extended Emergency Contact Information  Primary Emergency Contact: Gucci Price  Mobile Phone: [phone]  Relation: Child  Hard of hearing? Yes  Preferred language: English   needed? No    General Health  Weight   Vitals:    08/29/24 1147   Weight: 61.2 kg (135 lb)     BMI Body mass index is 27.27 kg/m².    Allergies  Allergies   Allergen Reactions    Restasis [Cyclosporine] Itching       Past Medical History   Past Medical History:   Diagnosis Date    Breast cancer (Multi) 1985    Left breast    GERD (gastroesophageal reflux disease)     Hyperlipidemia     Hypertension     Hypothyroid     Neurofibromatosis, unspecified (Multi) 12/18/2018    History of neurofibromatosis    Other conditions influencing health status     Arthritis    Personal history of other diseases of the circulatory system     History of essential hypertension    Personal history of other diseases of the musculoskeletal system and connective tissue     History of arthritis    Polyp of colon     Polyp of sigmoid colon    Unspecified visual loss     Vision problem       Provider assessment  Diagnosis  Medication Reviewed - yes  Prior to Admission medications    Medication Sig Start Date End Date Taking? Authorizing Provider   aspirin 81 mg EC tablet Take 1 tablet (81 mg) by mouth once daily.   Yes Historical Provider, MD   atorvastatin (Lipitor) 10 mg tablet Take 1 tablet (10 mg) by mouth once daily.   Yes Historical Provider, MD   docusate sodium (Colace) 100 mg capsule Take 1 capsule (100 mg)  by mouth if needed for constipation.   Yes Historical Provider, MD   famotidine (Pepcid) 20 mg tablet Take 1 tablet (20 mg) by mouth once daily.   Yes Historical Provider, MD   levothyroxine (Synthroid, Levoxyl) 75 mcg tablet Take 88 mcg by mouth once daily in the morning. Take before meals.   Yes Historical Provider, MD   linaCLOtide (Linzess) 290 mcg capsule Take 1 capsule (290 mcg) by mouth once daily in the morning. Take before meals. Do not crush or chew.   Yes Historical Provider, MD   linaCLOtide (Linzess) 290 mcg capsule Take 1 capsule (290 mcg) by mouth once daily in the morning. Take before meals. Do not crush or chew. 2/1/24 1/31/25 Yes FRAN Reed-CNP   losartan (Cozaar) 25 mg tablet Take 1 tablet (25 mg) by mouth once daily.   Yes Historical Provider, MD   omeprazole (PriLOSEC) 20 mg DR capsule Take 1 capsule (20 mg) by mouth 2 times a day. Do not crush or chew. 1/9/24  Yes Nina NAM MD   traZODone (Desyrel) 50 mg tablet Take 1 tablet (50 mg) by mouth once daily at bedtime.   Yes Historical Provider, MD   denosumab (Prolia) 60 mg/mL syringe Inject 1 mL (60 mg) under the skin every 6 months.    Historical Provider, MD       This is my H&P    Physical Exam  Physical Exam  Constitutional:       Comments: Awake   HENT:      Head: Normocephalic.   Cardiovascular:      Rate and Rhythm: Normal rate and regular rhythm.   Pulmonary:      Effort: Pulmonary effort is normal.      Breath sounds: Normal breath sounds.   Abdominal:      General: Bowel sounds are normal.      Palpations: Abdomen is soft.   Neurological:      Mental Status: She is alert.   Psychiatric:         Mood and Affect: Mood normal.           Oropharyngeal Classification II (hard and soft palate, upper portion of tonsils and uvula visible)  ASA PS Classification 2  Sedation Plan Deep  Procedure Plan - pre-procedural (re)assesment completed by physician:  discharge/transfer patient when discharge criteria met    Nina Mccauley  MD  8/29/2024 11:55 AM

## 2024-08-29 NOTE — DISCHARGE INSTRUCTIONS
Patient Instructions Post Endoscopy Procedure      The anesthetics, sedatives or narcotics which were given to you today will be acting in your body for the next 24 hours, so you might feel a little sleepy or groggy.  This feeling should slowly wear off. Carefully read and follow the instructions.     You received sedation today:  - Do not drive or operate any machinery or power tools of any kind.   - No alcoholic beverages today, not even beer or wine.  - Do not make any important decisions or sign any legal documents.  - No over the counter medications that contain alcohol or that may cause drowsiness.    While it is common to experience mild to moderate abdominal distention, gas, or belching after your procedure, if any of these symptoms occur following discharge from the GI Lab or within one week of having your procedure, call the [facility] to be advised whether a visit to your nearest Urgent Care or Emergency Department is indicated.  Take this paper with you if you go.   - If you develop an allergic reaction to the medications that were given during your procedure such as difficulty breathing, rash, hives, severe nausea, vomiting or lightheadedness.  - If you experience chest pain, shortness of breath, severe abdominal pain, fevers and chills.  -If you develop signs and symptoms of bleeding such as blood in your spit, if your stools turn black, tarry, or bloody  - If you have not urinated within 8 hours following your procedure.  - If your IV site becomes painful, red, inflamed, or looks infected.    If you received a biopsy/polypectomy the following instructions apply below:  _x_ Do not use non-steroidal medications or anti-coagulants for one week following your procedure. (Examples of these types of medications are: Advil, Arthrotec, Aleve, Coumadin, Ecotrin, Heparin, Ibuprofen, Indocin, Motrin, Naprosyn, Nuprin, Plavix, Vioxx, and Voltarin, or their generic forms.  This list is not  all-inclusive.  Check with your physician or pharmacist before resuming medications.)   _x_ Eat a soft diet today.  Avoid foods that are poorly digested for the next 24 hours.  These foods would include: nuts, beans, lettuce, red meats, and fried foods. Start with liquids and advance your diet as tolerated, gradually work up to eating solids.   _x_ You can restart your ASA tomorrow  __ You can resume your anticoagulation therapy -     Your physician recommends the additional following instructions:    -You have a contact number available for emergencies. The signs and symptoms of potential delayed complications were discussed with you. You may return to normal activities tomorrow.  -Resume your previous diet or other if specified.  -Continue your present medications.   -We are waiting for your pathology results, if applicable. The results will be available in [facility]. I will send you a message with any recommendations.  -The findings and recommendations have been discussed with you and/or family.  -Please see Medication Reconciliation Form for new medication/medications prescribed.     If you experience any problems or have any questions following discharge from the GI Lab, please call: [phone] from 7 am- 4:30 pm.  In the event of an emergency please go to the closest Emergency Department or call Dr. Mccauley at [phone]    \

## 2024-09-05 LAB
LABORATORY COMMENT REPORT: NORMAL
PATH REPORT.FINAL DX SPEC: NORMAL
PATH REPORT.GROSS SPEC: NORMAL
PATH REPORT.RELEVANT HX SPEC: NORMAL
PATH REPORT.TOTAL CANCER: NORMAL

## 2024-09-09 NOTE — PROGRESS NOTES
Sinus & Skull Base Surgery    Chief Complaint:  1.  Rhinorrhea  2.  Facial pain / pressure, nasal pain  3.  Heartburn on proton pump inhibitor   4.  Deviated nasal septum   5.  Neurofibromatosis    History Of Present Illness:  Christina Price presents since last being seen 6/5/24.    During her last evaluation I recommended utilization of saline gel for dryness symptoms that she was having.  She feels that her dryness has improved overall.  She is using saline gel and spray form as needed and Flonase as needed.  She did mention that when she uses the saline gel she will sneeze frequently.    Main Symptoms:  Patient has anterior nasal drainage.  Patient has posterior nasal drainage.  Patient does not have nasal airway obstruction.  Patient has facial pain. In the forehead. Less than 50% of the time.  Patient has facial pressure. In the forehead. Less than 50% of the time  Patient does not have decreased sense of smell.  Associated Symptoms:  Patient has headaches.  Patient has throat clearing. Intermittent  Patient has coughing. Intermittent  Patient does not have dysphonia.  Patient does not have nasal bleeding. Dryness is improving with saline gel.    Medications currently on for sinonasal symptoms:  Saline gel in spray form PRN  Flonase PRN    Active Problems:  Patient Active Problem List   Diagnosis    Acute chest pain    Acute pharyngitis    Ankle pain    Arm pain    Calcification of abdominal aorta (CMS-HCC)    Chronic constipation    Cough    Dislocation of right shoulder joint    Epidermal cyst    Esophageal reflux    Eustachian tube dysfunction    Ganglion of hand, right    Heartburn    History of hypertension    History of left mastectomy    History of malignant neoplasm of breast    History of reduction surgery of right breast    Hypothyroidism    Bilateral inguinal hernia    Low back pain    Lump or mass in breast    Lung field abnormal    Lung nodules    Adenopathy    Nasal vestibulitis     Osteoarthritis of ankle    Osteoarthritis of right knee    Osteoporosis    Other benign neoplasm of skin, unspecified    Other seborrheic dermatitis    Palpitations    Polyp of sigmoid colon    Localized osteoarthrosis    Rheumatoid arthritis (Multi)    S/P total abdominal hysterectomy and bilateral salpingo-oophorectomy    Sore throat    Swelling of arm    Synovitis and tenosynovitis    URI (upper respiratory infection)    Viral pharyngitis    Vision disorder     Past Medical History:  She has a past medical history of Breast cancer (Multi) (1985), GERD (gastroesophageal reflux disease), Hyperlipidemia, Hypertension, Hypothyroid, Neurofibromatosis, unspecified (Multi) (12/18/2018), Other conditions influencing health status, Personal history of other diseases of the circulatory system, Personal history of other diseases of the musculoskeletal system and connective tissue, Polyp of colon, and Unspecified visual loss.    Surgical History:  She has a past surgical history that includes Total knee arthroplasty (11/05/2013); Mastectomy, partial (1985); Appendectomy (11/05/2013); Other surgical history (11/05/2013); Colonoscopy (11/05/2013); and Other surgical history (Right, 1985).     Family History:  No family history on file.    Social History:  She reports that she has never smoked. She has never used smokeless tobacco. She reports that she does not currently use alcohol. She reports that she does not currently use drugs.     Allergies:  Restasis [cyclosporine]    Current Meds:  Current Outpatient Medications:     aspirin 81 mg EC tablet, Take 1 tablet (81 mg) by mouth once daily., Disp: , Rfl:     atorvastatin (Lipitor) 10 mg tablet, Take 1 tablet (10 mg) by mouth once daily., Disp: , Rfl:     denosumab (Prolia) 60 mg/mL syringe, Inject 1 mL (60 mg) under the skin every 6 months., Disp: , Rfl:     docusate sodium (Colace) 100 mg capsule, Take 1 capsule (100 mg) by mouth if needed for constipation., Disp: , Rfl:      famotidine (Pepcid) 20 mg tablet, Take 1 tablet (20 mg) by mouth once daily., Disp: , Rfl:     levothyroxine (Synthroid, Levoxyl) 75 mcg tablet, Take 88 mcg by mouth once daily in the morning. Take before meals., Disp: , Rfl:     linaCLOtide (Linzess) 290 mcg capsule, Take 1 capsule (290 mcg) by mouth once daily in the morning. Take before meals. Do not crush or chew., Disp: , Rfl:     linaCLOtide (Linzess) 290 mcg capsule, Take 1 capsule (290 mcg) by mouth once daily in the morning. Take before meals. Do not crush or chew., Disp: 90 capsule, Rfl: 3    losartan (Cozaar) 25 mg tablet, Take 1 tablet (25 mg) by mouth once daily., Disp: , Rfl:     omeprazole (PriLOSEC) 20 mg DR capsule, Take 1 capsule (20 mg) by mouth 2 times a day. Do not crush or chew., Disp: 180 capsule, Rfl: 3    traZODone (Desyrel) 50 mg tablet, Take 1 tablet (50 mg) by mouth once daily at bedtime., Disp: , Rfl:     Vitals:  Visit Vitals  OB Status Postmenopausal   Smoking Status Never     Physical Exam:  Nose: On external exam there are neither lesions nor asymmetry of the nasal tip/dorsum. On anterior rhinoscopy, visualization posteriorly is limited on anterior examination. For this reason, to adequately evaluate posteriorly for masses, source of epistaxis, polypoid disease, debridement, and/or signs of infections, nasal endoscopy is indicated.  (Please see procedure below.)    SINONASAL ENDOSCOPY (CPT 41385): To better evaluate the patient's symptoms, sinonasal endoscopy is indicated.  After discussion of risks and benefits, and topical decongestion and anesthesia, an endoscope was used to perform nasal endoscopy on each side.  A time out identifying the patient, the procedure, the location of the procedure and any concerns was performed prior to beginning the procedure.    Findings:  Examination of the right nasal cavity revealed no evidence of purulence or polyposis at the middle meatus or sphenoethmoid recess.  Examination of the left nasal  cavity revealed no evidence of purulence or polyposis at the middle meatus or sphenoethmoid recess.  She has a septal deviation to the right.    Provider Impressions:  1.  Rhinorrhea  2.  Facial pain / pressure, nasal pain, headaches  3.  Throat clearing, coughing  4.  Heartburn on proton pump inhibitor   5.  Deviated nasal septum  6.  Neurofibromatosis    Discussion:  Christina Price and I discussed her exam and symptoms.  In regard to the sneezing with saline gel, we discussed some alternative administration techniques but if the nasal dryness is no longer an issue she could certainly discontinue that medical therapy.    In regard to her drainage symptoms, I think that ipratropium would be her best option based on what she is describing and this was prescribed.  It can be used as needed and we discussed appropriate administration technique.    We again discussed Flonase and that, in her context, she may do better with ipratropium.  Although I certainly think she will get benefit if she uses the Flonase it works better when used consistently.  In some individuals it can cause nasal irritation and dryness so I asked her to keep a close eye on this.    I recommended repeat assessment in 12 months.  All questions were answered.    Scribe Attestation  By signing my name below, I, Vincent Tejada, Kevin, attest that this documentation has been prepared under the direction and in the presence of Gurpreet Guerrero MD.    Signature:  Gurpreet Guerrero MD

## 2024-09-11 ENCOUNTER — APPOINTMENT (OUTPATIENT)
Dept: OTOLARYNGOLOGY | Facility: CLINIC | Age: 84
End: 2024-09-11
Payer: COMMERCIAL

## 2024-09-11 DIAGNOSIS — R09.89 THROAT CLEARING: ICD-10-CM

## 2024-09-11 DIAGNOSIS — R44.8 FACIAL PRESSURE: ICD-10-CM

## 2024-09-11 DIAGNOSIS — J34.89 RHINORRHEA: Primary | ICD-10-CM

## 2024-09-11 DIAGNOSIS — J34.2 DEVIATED NASAL SEPTUM: ICD-10-CM

## 2024-09-11 PROCEDURE — 1160F RVW MEDS BY RX/DR IN RCRD: CPT | Performed by: OTOLARYNGOLOGY

## 2024-09-11 PROCEDURE — 1125F AMNT PAIN NOTED PAIN PRSNT: CPT | Performed by: OTOLARYNGOLOGY

## 2024-09-11 PROCEDURE — 31231 NASAL ENDOSCOPY DX: CPT | Performed by: OTOLARYNGOLOGY

## 2024-09-11 PROCEDURE — 99213 OFFICE O/P EST LOW 20 MIN: CPT | Performed by: OTOLARYNGOLOGY

## 2024-09-11 PROCEDURE — 1159F MED LIST DOCD IN RCRD: CPT | Performed by: OTOLARYNGOLOGY

## 2024-09-11 RX ORDER — IPRATROPIUM BROMIDE 21 UG/1
2 SPRAY, METERED NASAL 3 TIMES DAILY
Qty: 30 ML | Refills: 11 | Status: SHIPPED | OUTPATIENT
Start: 2024-09-11 | End: 2025-09-11

## 2024-09-11 ASSESSMENT — PAIN SCALES - GENERAL: PAINLEVEL: 2

## 2024-10-12 ENCOUNTER — LAB (OUTPATIENT)
Dept: LAB | Facility: LAB | Age: 84
End: 2024-10-12
Payer: COMMERCIAL

## 2024-10-12 DIAGNOSIS — R73.03 PREDIABETES: ICD-10-CM

## 2024-10-12 DIAGNOSIS — Z11.59 ENCOUNTER FOR SCREENING FOR OTHER VIRAL DISEASES: ICD-10-CM

## 2024-10-12 DIAGNOSIS — I10 ESSENTIAL (PRIMARY) HYPERTENSION: Primary | ICD-10-CM

## 2024-10-12 DIAGNOSIS — E03.9 HYPOTHYROIDISM, UNSPECIFIED: ICD-10-CM

## 2024-10-12 LAB
ANION GAP SERPL CALC-SCNC: 12 MMOL/L (ref 10–20)
BUN SERPL-MCNC: 14 MG/DL (ref 6–23)
CALCIUM SERPL-MCNC: 9.5 MG/DL (ref 8.6–10.6)
CHLORIDE SERPL-SCNC: 103 MMOL/L (ref 98–107)
CO2 SERPL-SCNC: 27 MMOL/L (ref 21–32)
CREAT SERPL-MCNC: 0.49 MG/DL (ref 0.5–1.05)
EGFRCR SERPLBLD CKD-EPI 2021: >90 ML/MIN/1.73M*2
EST. AVERAGE GLUCOSE BLD GHB EST-MCNC: 103 MG/DL
GLUCOSE SERPL-MCNC: 91 MG/DL (ref 74–99)
HBA1C MFR BLD: 5.2 %
HCV AB SER QL: NONREACTIVE
POTASSIUM SERPL-SCNC: 4.3 MMOL/L (ref 3.5–5.3)
SODIUM SERPL-SCNC: 138 MMOL/L (ref 136–145)
TSH SERPL-ACNC: 0.89 MIU/L (ref 0.44–3.98)

## 2024-10-12 PROCEDURE — G0472 HEP C SCREEN HIGH RISK/OTHER: HCPCS

## 2024-10-12 PROCEDURE — 84443 ASSAY THYROID STIM HORMONE: CPT

## 2024-10-12 PROCEDURE — 36415 COLL VENOUS BLD VENIPUNCTURE: CPT

## 2024-10-12 PROCEDURE — 80048 BASIC METABOLIC PNL TOTAL CA: CPT

## 2024-10-12 PROCEDURE — 83036 HEMOGLOBIN GLYCOSYLATED A1C: CPT

## 2024-11-08 NOTE — PROGRESS NOTES
Sinus & Skull Base Surgery    Chief Complaint:  1.  Rhinorrhea  2.  Facial pain / pressure, nasal pain, headaches  3.  Throat clearing, coughing  4.  Heartburn on proton pump inhibitor   5.  Deviated nasal septum  6.  Neurofibromatosis    History Of Present Illness:    Christina Price presents since last being seen September 11, 2024.     She feels that she has been seeing some benefit with ipratropium and is using this as needed.  She developed a sore in the right side of her nose and had mupirocin at home and use this with resolution.  She is a bit unclear as to when the lesion arose and the total length of time it was present but she guesses it was there about 2 weeks before resolution.    Main Symptoms:  Patient does not have anterior nasal drainage.  Patient has posterior nasal drainage.  Patient has nasal airway obstruction.  Patient does not have facial pain.  Patient does not have facial pressure.  Patient has decreased sense of smell.   Associated Symptoms:  Patient has headaches. Occasional, usually over forehead.   Patient does not have throat clearing.  Patient does not have coughing.  Patient does not have dysphonia.  Patient does not have sneezing.  Patient does not have itchy eyes.  Patient does not have nasal bleeding.    Medications currently on for sinonasal symptoms:  Ipratropium as needed    Active Problems:  Patient Active Problem List   Diagnosis    Acute chest pain    Acute pharyngitis    Ankle pain    Arm pain    Calcification of abdominal aorta (CMS-HCC)    Chronic constipation    Cough    Dislocation of right shoulder joint    Epidermal cyst    Esophageal reflux    Eustachian tube dysfunction    Ganglion of hand, right    Heartburn    History of hypertension    History of left mastectomy    History of malignant neoplasm of breast    History of reduction surgery of right breast    Hypothyroidism    Bilateral inguinal hernia    Low back pain    Lump or mass in breast    Lung field  abnormal    Lung nodules    Adenopathy    Nasal vestibulitis    Osteoarthritis of ankle    Osteoarthritis of right knee    Osteoporosis    Other benign neoplasm of skin, unspecified    Other seborrheic dermatitis    Palpitations    Polyp of sigmoid colon    Localized osteoarthrosis    Rheumatoid arthritis    S/P total abdominal hysterectomy and bilateral salpingo-oophorectomy    Sore throat    Swelling of arm    Synovitis and tenosynovitis    URI (upper respiratory infection)    Viral pharyngitis    Vision disorder     Past Medical History:  She has a past medical history of Breast cancer (Multi) (1985), GERD (gastroesophageal reflux disease), Hyperlipidemia, Hypertension, Hypothyroid, Neurofibromatosis, unspecified (Multi) (12/18/2018), Other conditions influencing health status, Personal history of other diseases of the circulatory system, Personal history of other diseases of the musculoskeletal system and connective tissue, Polyp of colon, and Unspecified visual loss.    Surgical History:  She has a past surgical history that includes Total knee arthroplasty (11/05/2013); Mastectomy, partial (1985); Appendectomy (11/05/2013); Other surgical history (11/05/2013); Colonoscopy (11/05/2013); and Other surgical history (Right, 1985).    Family History:  No family history on file.    Social History:  She reports that she has never smoked. She has never used smokeless tobacco. She reports that she does not currently use alcohol. She reports that she does not currently use drugs.    Allergies:  Restasis [cyclosporine]    Current Meds:  Current Outpatient Medications:     gabapentin (Neurontin) 100 mg capsule, Take 1 capsule (100 mg) by mouth., Disp: , Rfl:     ofloxacin (Ocuflox) 0.3 % ophthalmic solution, , Disp: , Rfl:     aspirin 81 mg EC tablet, Take 1 tablet (81 mg) by mouth once daily., Disp: , Rfl:     atorvastatin (Lipitor) 10 mg tablet, Take 1 tablet (10 mg) by mouth once daily., Disp: , Rfl:     denosumab  (Prolia) 60 mg/mL syringe, Inject 1 mL (60 mg) under the skin every 6 months., Disp: , Rfl:     docusate sodium (Colace) 100 mg capsule, Take 1 capsule (100 mg) by mouth if needed for constipation., Disp: , Rfl:     famotidine (Pepcid) 20 mg tablet, Take 1 tablet (20 mg) by mouth once daily., Disp: , Rfl:     ipratropium (Atrovent) 21 mcg (0.03 %) nasal spray, Administer 2 sprays into each nostril 3 times a day., Disp: 30 mL, Rfl: 11    levothyroxine (Synthroid, Levoxyl) 75 mcg tablet, Take 88 mcg by mouth once daily in the morning. Take before meals., Disp: , Rfl:     linaCLOtide (Linzess) 290 mcg capsule, Take 1 capsule (290 mcg) by mouth once daily in the morning. Take before meals. Do not crush or chew., Disp: , Rfl:     linaCLOtide (Linzess) 290 mcg capsule, Take 1 capsule (290 mcg) by mouth once daily in the morning. Take before meals. Do not crush or chew., Disp: 90 capsule, Rfl: 3    losartan (Cozaar) 25 mg tablet, Take 1 tablet (25 mg) by mouth once daily., Disp: , Rfl:     mupirocin (Bactroban) 2 % ointment, Apply topically 3 times a day for 10 days., Disp: 22 g, Rfl: 1    omeprazole (PriLOSEC) 20 mg DR capsule, Take 1 capsule (20 mg) by mouth 2 times a day. Do not crush or chew., Disp: 180 capsule, Rfl: 3    traZODone (Desyrel) 50 mg tablet, Take 1 tablet (50 mg) by mouth once daily at bedtime., Disp: , Rfl:     Vitals:  Visit Vitals  OB Status Postmenopausal   Smoking Status Never     Physical Exam:  Nose: On external exam there are neither lesions nor asymmetry of the nasal tip/dorsum. On anterior rhinoscopy, visualization posteriorly is limited on anterior examination. For this reason, to adequately evaluate posteriorly for masses, source of epistaxis, polypoid disease, debridement, and/or signs of infections, nasal endoscopy is indicated.  (Please see procedure below.)    SINONASAL ENDOSCOPY (CPT 18380): To better evaluate the patient's symptoms, sinonasal endoscopy is indicated.  After discussion of  risks and benefits, and topical decongestion and anesthesia, an endoscope was used to perform nasal endoscopy on each side.  A time out identifying the patient, the procedure, the location of the procedure and any concerns was performed prior to beginning the procedure.    Findings:  Examination of the right nasal cavity revealed no evidence of purulence or polyposis at the middle meatus or sphenoethmoid recess.  Her septum was deviated to the right.  There were no concerning nasal cavity lesions in the region of concern within the right anterior nasal cavity.  Examination of the left nasal cavity revealed no evidence of purulence or polyposis at the middle meatus or sphenoethmoid recess.    Provider Impressions:  1.  Postnasal drainage  2.  Nasal airway obstruction  3.  Decreased sense of smell  4.  Heartburn on proton pump inhibitor   5.  Deviated nasal septum  6.  Neurofibromatosis  7.  Headaches    Discussion:  Christina Price and I discussed her current exam and symptoms.  I recommended continuation of ipratropium given her benefit.  The right nasal cavity sore has resolved and I did give her a refill for mupirocin as her current prescription was going to .  I suggested using this as needed if she has another lesion appear.  If it persists I have asked her to follow-up with me for further evaluation.  If she does well I recommended repeat assessment in 12 months.  All questions were answered.    Scribe Attestation  By signing my name below, IVincent Scribe, attest that this documentation has been prepared under the direction and in the presence of Gurpreet Guerrero MD.    Signature:  Gurpreet Guerrero MD

## 2024-11-13 ENCOUNTER — APPOINTMENT (OUTPATIENT)
Facility: CLINIC | Age: 84
End: 2024-11-13
Payer: COMMERCIAL

## 2024-11-13 DIAGNOSIS — J34.2 DEVIATED NASAL SEPTUM: ICD-10-CM

## 2024-11-13 DIAGNOSIS — J31.0 CHRONIC RHINITIS: Primary | ICD-10-CM

## 2024-11-13 DIAGNOSIS — R09.81 NASAL CONGESTION: ICD-10-CM

## 2024-11-13 DIAGNOSIS — R09.82 POST-NASAL DRAINAGE: ICD-10-CM

## 2024-11-13 PROCEDURE — 31231 NASAL ENDOSCOPY DX: CPT | Performed by: OTOLARYNGOLOGY

## 2024-11-13 PROCEDURE — 1159F MED LIST DOCD IN RCRD: CPT | Performed by: OTOLARYNGOLOGY

## 2024-11-13 PROCEDURE — 1036F TOBACCO NON-USER: CPT | Performed by: OTOLARYNGOLOGY

## 2024-11-13 PROCEDURE — 1126F AMNT PAIN NOTED NONE PRSNT: CPT | Performed by: OTOLARYNGOLOGY

## 2024-11-13 PROCEDURE — 99213 OFFICE O/P EST LOW 20 MIN: CPT | Performed by: OTOLARYNGOLOGY

## 2024-11-13 RX ORDER — OFLOXACIN 3 MG/ML
SOLUTION/ DROPS OPHTHALMIC
COMMUNITY
Start: 2024-10-30

## 2024-11-13 RX ORDER — GABAPENTIN 100 MG/1
100 CAPSULE ORAL
COMMUNITY
Start: 2024-11-04 | End: 2025-11-04

## 2024-11-13 RX ORDER — MUPIROCIN 20 MG/G
OINTMENT TOPICAL
Qty: 22 G | Refills: 1 | Status: SHIPPED | OUTPATIENT
Start: 2024-11-13 | End: 2024-11-23

## 2024-11-13 ASSESSMENT — PAIN SCALES - GENERAL: PAINLEVEL_OUTOF10: 0-NO PAIN

## 2024-11-22 ENCOUNTER — APPOINTMENT (OUTPATIENT)
Dept: RADIOLOGY | Facility: HOSPITAL | Age: 84
End: 2024-11-22
Payer: COMMERCIAL

## 2024-12-05 ENCOUNTER — HOSPITAL ENCOUNTER (OUTPATIENT)
Dept: RADIOLOGY | Facility: HOSPITAL | Age: 84
Discharge: HOME | End: 2024-12-05
Payer: COMMERCIAL

## 2024-12-05 VITALS — WEIGHT: 133 LBS | BODY MASS INDEX: 26.81 KG/M2 | HEIGHT: 59 IN

## 2024-12-05 DIAGNOSIS — Z12.31 ENCOUNTER FOR SCREENING MAMMOGRAM FOR MALIGNANT NEOPLASM OF BREAST: ICD-10-CM

## 2024-12-05 PROCEDURE — 77067 SCR MAMMO BI INCL CAD: CPT

## 2024-12-16 NOTE — PROGRESS NOTES
REASON FOR VISIT:  GERD/Chronic constipation     HPI:  Christina Price is a 84 y.o. female who presents for a follow up appointment     Patient overall feeling well  She is moving her bowels 2x per day . She takes Linzess 290mcg . Concerned with cost of medication next year   Denies BRB, melena, or mucous   Denies abdominal pain or cramping   She had one episode of explosive diarrhea after eating too soon after taking Linzess (she takes it around 9am)- otherwise she has 2 BM a day.  Weight and appetite stable   GERD symptoms controlled - will drink milk for breakthrough symptoms. Taking famotidine and omeprazole once daily (PPI  in the morning, H2B in the evening       Med list notable for Linzess 290mcg , famotidine , omeprazole 20mg once daily     Labs notable for    No fhx of CRC.       Prev endoscopic eval:   >> Colonoscopy 08/2024  Impression  Scattered diverticulosis of moderate severity in the sigmoid colon  Hemorrhoids  4 subcentimeter polyps were removed    REVIEW OF SYSTEMS    Review of Systems   Constitutional: Negative for chills, decreased appetite, fever and weight loss.   Cardiovascular:  Negative for chest pain.   Respiratory:  Negative for cough and shortness of breath.    Gastrointestinal:  Negative for bloating, abdominal pain, constipation, diarrhea, dysphagia, heartburn, hematemesis, hematochezia, hemorrhoids, melena, nausea and vomiting.          Allergies   Allergen Reactions    Restasis [Cyclosporine] Itching       Past Medical History:   Diagnosis Date    Breast cancer (Multi) 1985    Left breast    GERD (gastroesophageal reflux disease)     Hyperlipidemia     Hypertension     Hypothyroid     Neurofibromatosis, unspecified (Multi) 12/18/2018    History of neurofibromatosis    Other conditions influencing health status     Arthritis    Personal history of other diseases of the circulatory system     History of essential hypertension    Personal history of other diseases of the musculoskeletal  system and connective tissue     History of arthritis    Polyp of colon     Polyp of sigmoid colon    Unspecified visual loss     Vision problem       Past Surgical History:   Procedure Laterality Date    APPENDECTOMY  11/05/2013    Appendectomy    COLONOSCOPY  11/05/2013    Complete Colonoscopy    MASTECTOMY, PARTIAL  1985    Breast Surgery Partial Mastectomy    OTHER SURGICAL HISTORY  11/05/2013    Upper Gastrointestinal Endoscopy (Therapeutic)    OTHER SURGICAL HISTORY Right 1985    breast reduction    TOTAL KNEE ARTHROPLASTY  11/05/2013    Knee Replacement       No family history on file.    Social History     Tobacco Use    Smoking status: Never    Smokeless tobacco: Never   Substance Use Topics    Alcohol use: Not Currently       Current Outpatient Medications   Medication Sig Dispense Refill    aspirin 81 mg EC tablet Take 1 tablet (81 mg) by mouth once daily.      atorvastatin (Lipitor) 10 mg tablet Take 1 tablet (10 mg) by mouth once daily.      denosumab (Prolia) 60 mg/mL syringe Inject 1 mL (60 mg) under the skin every 6 months.      docusate sodium (Colace) 100 mg capsule Take 1 capsule (100 mg) by mouth if needed for constipation.      famotidine (Pepcid) 20 mg tablet Take 1 tablet (20 mg) by mouth once daily.      gabapentin (Neurontin) 100 mg capsule Take 1 capsule (100 mg) by mouth.      ipratropium (Atrovent) 21 mcg (0.03 %) nasal spray Administer 2 sprays into each nostril 3 times a day. 30 mL 11    levothyroxine (Synthroid, Levoxyl) 75 mcg tablet Take 88 mcg by mouth once daily in the morning. Take before meals.      linaCLOtide (Linzess) 290 mcg capsule Take 1 capsule (290 mcg) by mouth once daily in the morning. Take before meals. Do not crush or chew.      linaCLOtide (Linzess) 290 mcg capsule Take 1 capsule (290 mcg) by mouth once daily in the morning. Take before meals. Do not crush or chew. 90 capsule 3    losartan (Cozaar) 25 mg tablet Take 1 tablet (25 mg) by mouth once daily.       ofloxacin (Ocuflox) 0.3 % ophthalmic solution       omeprazole (PriLOSEC) 20 mg DR capsule Take 1 capsule (20 mg) by mouth 2 times a day. Do not crush or chew. 180 capsule 3    traZODone (Desyrel) 50 mg tablet Take 1 tablet (50 mg) by mouth once daily at bedtime.       No current facility-administered medications for this visit.       PHYSICAL EXAM:  There were no vitals taken for this visit.     Physical Exam  Constitutional:       Comments: Awake   HENT:      Head: Normocephalic.   Cardiovascular:      Rate and Rhythm: Normal rate and regular rhythm.   Pulmonary:      Effort: Pulmonary effort is normal.      Breath sounds: Normal breath sounds.   Abdominal:      General: Bowel sounds are normal.      Palpations: Abdomen is soft.   Neurological:      Mental Status: She is alert.   Psychiatric:         Mood and Affect: Mood normal.        ASSESSMENT    84-year-old female presents for follow-up of longstanding chronic constipation and heartburn.  Overall she is doing well taking Linzess 290 mcg tablets once a day and taking omeprazole in the morning and famotidine at bedtime.  She has had a few episodes of diarrhea when she takes the Linzess too soon around eating she will have some explosive diarrhea but tends to resolve.  She has 2 bowel movements a day denies any alarm symptoms.  Her heartburn is well-controlled she can continue with her current management and follow-up in 6 months    Evaluation requested by Dr. Ella Ch MD.   My final recommendations will be communicated back to the requesting physician by way of shared Medical record or letter to requesting physician via fax.      Attending Note:   I have personally performed a face to face assessment of this Patient, which included an interview, physical exam and Assessment and Plan.  I have reviewed and confirmed the history and key findings as documented by the Medical Assistant and edited as appropriate.     Signature: Nina Mccauley MD    Date:  12/16/2024  Time: 4:00 PM

## 2024-12-17 ENCOUNTER — APPOINTMENT (OUTPATIENT)
Dept: GASTROENTEROLOGY | Facility: CLINIC | Age: 84
End: 2024-12-17
Payer: COMMERCIAL

## 2024-12-17 VITALS
RESPIRATION RATE: 18 BRPM | OXYGEN SATURATION: 97 % | SYSTOLIC BLOOD PRESSURE: 138 MMHG | DIASTOLIC BLOOD PRESSURE: 68 MMHG | BODY MASS INDEX: 27.42 KG/M2 | HEIGHT: 59 IN | HEART RATE: 69 BPM | WEIGHT: 136 LBS

## 2024-12-17 DIAGNOSIS — K21.9 GASTROESOPHAGEAL REFLUX DISEASE WITHOUT ESOPHAGITIS: ICD-10-CM

## 2024-12-17 DIAGNOSIS — K59.09 CHRONIC CONSTIPATION: Primary | ICD-10-CM

## 2024-12-17 PROCEDURE — 1036F TOBACCO NON-USER: CPT | Performed by: INTERNAL MEDICINE

## 2024-12-17 PROCEDURE — 99214 OFFICE O/P EST MOD 30 MIN: CPT | Performed by: INTERNAL MEDICINE

## 2024-12-17 PROCEDURE — 1159F MED LIST DOCD IN RCRD: CPT | Performed by: INTERNAL MEDICINE

## 2024-12-17 PROCEDURE — G2211 COMPLEX E/M VISIT ADD ON: HCPCS | Performed by: INTERNAL MEDICINE

## 2024-12-17 ASSESSMENT — ENCOUNTER SYMPTOMS
HEMATOCHEZIA: 0
COUGH: 0
NAUSEA: 0
BLOATING: 0
VOMITING: 0
SHORTNESS OF BREATH: 0
HEARTBURN: 0
FEVER: 0
DIARRHEA: 0
CHILLS: 0
ABDOMINAL PAIN: 0
HEMATEMESIS: 0
DECREASED APPETITE: 0
WEIGHT LOSS: 0
CONSTIPATION: 0

## 2025-02-27 ENCOUNTER — TELEPHONE (OUTPATIENT)
Dept: GASTROENTEROLOGY | Facility: CLINIC | Age: 85
End: 2025-02-27
Payer: COMMERCIAL

## 2025-02-27 NOTE — TELEPHONE ENCOUNTER
Can you please reach out to this patient?   She states she is having extreme gas pains and random regurgitation.     She does not want to proceed to ER at this time as advised by OB.    She was informed she might need CT.     Appointment scheduled with [facility] for first available 3/12 pm appointment. She was offered sooner, declines early am.

## 2025-02-27 NOTE — TELEPHONE ENCOUNTER
Patient has occasional right upper quad pain rating it a 4/10 . She states this is not a new pain . She is also having more bloating and flatulence. She has a hx of GERD and constipation. She is moving her bowels 1x per day. Denies BRB, melena, or mucous. She is on linzess and PPI 20mg once a day. She reports having some regurgitation with solids when going from a laying to sitting position in her recliner. We have scheduled her for a follow up on 3/12 to discuss. I have advised her that if symptoms become severe she needs to report to ED . She will avoid coffee, pop, acidic foods for now.

## 2025-03-04 ENCOUNTER — APPOINTMENT (OUTPATIENT)
Dept: GASTROENTEROLOGY | Facility: CLINIC | Age: 85
End: 2025-03-04
Payer: MEDICARE

## 2025-03-04 VITALS
SYSTOLIC BLOOD PRESSURE: 130 MMHG | DIASTOLIC BLOOD PRESSURE: 70 MMHG | BODY MASS INDEX: 27.21 KG/M2 | WEIGHT: 135 LBS | HEIGHT: 59 IN | RESPIRATION RATE: 18 BRPM | HEART RATE: 69 BPM | OXYGEN SATURATION: 97 %

## 2025-03-04 DIAGNOSIS — K21.9 GASTROESOPHAGEAL REFLUX DISEASE WITHOUT ESOPHAGITIS: ICD-10-CM

## 2025-03-04 DIAGNOSIS — K59.09 CHRONIC CONSTIPATION: Primary | ICD-10-CM

## 2025-03-04 PROCEDURE — 1036F TOBACCO NON-USER: CPT | Performed by: NURSE PRACTITIONER

## 2025-03-04 PROCEDURE — 99213 OFFICE O/P EST LOW 20 MIN: CPT | Performed by: NURSE PRACTITIONER

## 2025-03-04 PROCEDURE — 1159F MED LIST DOCD IN RCRD: CPT | Performed by: NURSE PRACTITIONER

## 2025-03-04 NOTE — PATIENT INSTRUCTIONS
-> Try to avoid dairy for the next few weeks to see if this helps with your bloating.  -> You can also try peppermint tea throughout the day to help with your bloating and gas  -> If you continue to experience abdominal pain please call me and I can order a CT scan of your abdomen [phone]  -> Follow-up with Dr. Mccauley in 1 year, sooner if needed

## 2025-03-04 NOTE — PROGRESS NOTES
Subjective   Patient ID: Christina Price is a 84 y.o. female who presents for Follow-up (C/O bloating, R sided pain. She was recommended a CT scan when she saw her GYN recently but they were unable to order. She feels a bubble in her throat. She experienced an episode of food regurgitation after eating a cookie and milk, has not happened since. She is taking Linzess 290, will need a refill. She is requesting samples, states Dr. Mccauley always give her them when she comes in. Gas-X does help.).  HPI  LOV with Dr. Mccauley 12/17/24:  84-year-old female presents for follow-up of longstanding chronic constipation and heartburn. Overall she is doing well taking Linzess 290 mcg tablets once a day and taking omeprazole in the morning and famotidine at bedtime. She has had a few episodes of diarrhea when she takes the Linzess too soon around eating she will have some explosive diarrhea but tends to resolve. She has 2 bowel movements a day denies any alarm symptoms. Her heartburn is well-controlled she can continue with her current management and follow-up in 6 months      [facility] follow-up 3/4/2025:  Patient here with complaints of right upper quadrant pain that occurred abruptly and resolved abruptly.  She reports gas and bloating on occasion.  She does consume a moderate amount of dairy products.  She has not had any reoccurrence of pain.  She is taking Linzess to 90 mcg daily with good response.  She is having daily soft formed bowel movements without melena or hematochezia.  Her weight and appetite are stable.    She had 1 episode with easy regurgitation of Oreo cookies.  However, she was laying down when she was eating them and when she reclined back up in the chair she had easy regurgitation of the cookies.  When she is eating her meals she has no complaints.  She denies dysphagia or odynophagia.  No nausea or vomiting.    >> Colonoscopy 08/2024  Scattered diverticulosis of moderate severity in the sigmoid colon  Hemorrhoids  4  subcentimeter polyps were removed     Review of Systems   All other systems reviewed and are negative.      Objective   Physical Exam  Vitals reviewed.   Constitutional:       General: She is awake. She is not in acute distress.     Appearance: Normal appearance. She is well-developed and normal weight. She is not ill-appearing, toxic-appearing or diaphoretic.   HENT:      Head: Normocephalic and atraumatic.   Eyes:      Pupils: Pupils are equal, round, and reactive to light.   Neck:      Thyroid: No thyroid mass.      Trachea: Phonation normal.   Pulmonary:      Effort: Pulmonary effort is normal. No respiratory distress.      Breath sounds: Normal air entry. No decreased breath sounds.   Abdominal:      General: Bowel sounds are normal. There is no distension.      Palpations: Abdomen is soft.      Tenderness: There is no abdominal tenderness.   Musculoskeletal:      Cervical back: Neck supple.      Right lower leg: No edema.      Left lower leg: No edema.   Skin:     General: Skin is warm.      Capillary Refill: Capillary refill takes less than 2 seconds.      Coloration: Skin is not cyanotic, jaundiced or pale.   Neurological:      General: No focal deficit present.      Mental Status: She is alert and oriented to person, place, and time.      Cranial Nerves: Cranial nerves 2-12 are intact.      Motor: Motor function is intact. No weakness.      Gait: Gait normal.   Psychiatric:         Attention and Perception: Attention and perception normal.         Mood and Affect: Mood normal.         Speech: Speech normal.         Behavior: Behavior normal. Behavior is cooperative.         Assessment/Plan   Diagnoses and all orders for this visit:  Chronic constipation  84-year-old female with longstanding chronic constipation and GERD symptoms.  Her constipation seems to be well-managed with Linzess 290 mcg daily.  No red flag symptoms noted.  Colonoscopy in 2024.  She had an episode of right upper quadrant pain with  bloating and excessive flatus that has spontaneously resolved with no recurrence.  No red flag symptoms noted and the patient is nontoxic.  Recommend she avoid dairy products.  She can drink peppermint tea to help with bloating and gas.  Continue with Linzess  If pain reoccurs we can consider CT scan of the abdomen  Gastroesophageal reflux disease without esophagitis  No red flag symptoms noted.  Antireflux lifestyle strongly recommended, examples provided.         JACQUES Draper 03/04/25 2:44 PM

## 2025-03-05 ENCOUNTER — APPOINTMENT (OUTPATIENT)
Dept: GASTROENTEROLOGY | Facility: CLINIC | Age: 85
End: 2025-03-05
Payer: COMMERCIAL

## 2025-03-12 ENCOUNTER — APPOINTMENT (OUTPATIENT)
Dept: GASTROENTEROLOGY | Facility: CLINIC | Age: 85
End: 2025-03-12
Payer: COMMERCIAL

## 2025-04-28 ENCOUNTER — TELEPHONE (OUTPATIENT)
Dept: GASTROENTEROLOGY | Facility: CLINIC | Age: 85
End: 2025-04-28
Payer: MEDICARE

## 2025-04-28 NOTE — TELEPHONE ENCOUNTER
Son called in. The linzess 290 is $700 for a 3 month supply.   She does not qualify for patient assistance this year.     Pharmacist recommended Amitiza that would be a lower cost for the patient.     She is requesting guidance from Dr. Mccauley.

## 2025-04-29 NOTE — TELEPHONE ENCOUNTER
Spoke with patient. Per Dr. Mccauley she can have a few samples until I can get her seen in the clinic.   Samples of Linzess 290 placed and signed out to patient.   Patient scheduled for 5/6 with Dr. Mccauley.

## 2025-05-06 ENCOUNTER — APPOINTMENT (OUTPATIENT)
Dept: GASTROENTEROLOGY | Facility: CLINIC | Age: 85
End: 2025-05-06
Payer: MEDICARE

## 2025-05-06 ENCOUNTER — TELEPHONE (OUTPATIENT)
Dept: GASTROENTEROLOGY | Facility: CLINIC | Age: 85
End: 2025-05-06

## 2025-05-06 VITALS
SYSTOLIC BLOOD PRESSURE: 128 MMHG | WEIGHT: 135 LBS | DIASTOLIC BLOOD PRESSURE: 67 MMHG | RESPIRATION RATE: 16 BRPM | HEART RATE: 63 BPM | BODY MASS INDEX: 27.21 KG/M2 | HEIGHT: 59 IN | OXYGEN SATURATION: 98 %

## 2025-05-06 DIAGNOSIS — K59.09 CHRONIC CONSTIPATION: Primary | ICD-10-CM

## 2025-05-06 PROCEDURE — 1036F TOBACCO NON-USER: CPT | Performed by: INTERNAL MEDICINE

## 2025-05-06 PROCEDURE — G2211 COMPLEX E/M VISIT ADD ON: HCPCS | Performed by: INTERNAL MEDICINE

## 2025-05-06 PROCEDURE — 1159F MED LIST DOCD IN RCRD: CPT | Performed by: INTERNAL MEDICINE

## 2025-05-06 PROCEDURE — 99214 OFFICE O/P EST MOD 30 MIN: CPT | Performed by: INTERNAL MEDICINE

## 2025-05-06 RX ORDER — LUBIPROSTONE 24 UG/1
24 CAPSULE ORAL
Qty: 180 CAPSULE | Refills: 3 | Status: SHIPPED | OUTPATIENT
Start: 2025-05-06

## 2025-05-06 ASSESSMENT — ENCOUNTER SYMPTOMS
JAUNDICE: 0
DIARRHEA: 0
COUGH: 0
WEIGHT LOSS: 0
FEVER: 0
BLOATING: 0
CONSTIPATION: 0
CHILLS: 0
ABDOMINAL PAIN: 0
SHORTNESS OF BREATH: 0
DECREASED APPETITE: 0
NAUSEA: 0

## 2025-05-06 NOTE — PROGRESS NOTES
REASON FOR VISIT:  Chronic constipation     HPI:  Christina Price is a 84 y.o. female who presents for an established care visit     Patient has history of chronic constipation   She is currently moving her bowels 2x per day. She has been taking Linzess 290mcg daily and colace BID   Her prescription for linzess will cost her $700 for a 3 month supply . She is not eligible for patient assistance this year . She would like to talk about alternatives that are more cost efficient  She has likely failed amitiza in the past but willing to try again - it will cost her <$200 for 3 months           Med list notable for Linzess 290mcg, omeprazole, colace    Labs notable for    No fhx of CRC.       Prev endoscopic eval:   >> Colonoscopy 08/2024  Impression  Scattered diverticulosis of moderate severity in the sigmoid colon  Hemorrhoids  4 subcentimeter polyps were removed    REVIEW OF SYSTEMS    Review of Systems   Constitutional: Negative for chills, decreased appetite, fever and weight loss.   Respiratory:  Negative for cough and shortness of breath.    Gastrointestinal:  Negative for bloating, abdominal pain, constipation, diarrhea, dysphagia, jaundice, melena and nausea.          Allergies[1]    Medical History[2]    Surgical History[3]    Family History[4]    Social History     Tobacco Use    Smoking status: Never    Smokeless tobacco: Never   Substance Use Topics    Alcohol use: Not Currently       Current Medications[5]    PHYSICAL EXAM:  There were no vitals taken for this visit.     Physical Exam  Constitutional:       Comments: Awake   HENT:      Head: Normocephalic.   Cardiovascular:      Rate and Rhythm: Normal rate and regular rhythm.   Pulmonary:      Effort: Pulmonary effort is normal.      Breath sounds: Normal breath sounds.   Abdominal:      General: Bowel sounds are normal.      Palpations: Abdomen is soft.   Neurological:      Mental Status: She is alert.   Psychiatric:         Mood and Affect: Mood normal.           ASSESSMENT  Chronic Constipation - she has done well on Linzess but cost prohibitive. Will trial Amitiza 24mcg BID - if it does not work - hopefully she will qualify for pt assistance in 2026  Follow up in 6 months      Evaluation requested by Dr. Ella Ch MD.   My final recommendations will be communicated back to the requesting physician by way of shared Medical record or letter to requesting physician via fax.      Attending Note:   I have personally performed a face to face assessment of this Patient, which included an interview, physical exam and Assessment and Plan.  I have reviewed and confirmed the history and key findings as documented by the Medical Assistant and edited as appropriate.     Signature: Nina Mccauley MD    Date: 5/6/2025  Time: 7:26 AM         [1]   Allergies  Allergen Reactions    Restasis [Cyclosporine] Itching   [2]   Past Medical History:  Diagnosis Date    Breast cancer (Multi) 1985    Left breast    GERD (gastroesophageal reflux disease)     Hyperlipidemia     Hypertension     Hypothyroid     Neurofibromatosis, unspecified (Multi) 12/18/2018    History of neurofibromatosis    Other conditions influencing health status     Arthritis    Personal history of other diseases of the circulatory system     History of essential hypertension    Personal history of other diseases of the musculoskeletal system and connective tissue     History of arthritis    Polyp of colon     Polyp of sigmoid colon    Unspecified visual loss     Vision problem   [3]   Past Surgical History:  Procedure Laterality Date    APPENDECTOMY  11/05/2013    Appendectomy    COLONOSCOPY  11/05/2013    Complete Colonoscopy    MASTECTOMY, PARTIAL  1985    Breast Surgery Partial Mastectomy    OTHER SURGICAL HISTORY  11/05/2013    Upper Gastrointestinal Endoscopy (Therapeutic)    OTHER SURGICAL HISTORY Right 1985    breast reduction    TOTAL KNEE ARTHROPLASTY  11/05/2013    Knee Replacement   [4] No family  history on file.  [5]   Current Outpatient Medications   Medication Sig Dispense Refill    aspirin 81 mg EC tablet Take 1 tablet (81 mg) by mouth once daily.      atorvastatin (Lipitor) 10 mg tablet Take 1 tablet (10 mg) by mouth once daily.      denosumab (Prolia) 60 mg/mL syringe Inject 1 mL (60 mg) under the skin every 6 months.      docusate sodium (Colace) 100 mg capsule Take 1 capsule (100 mg) by mouth if needed for constipation.      famotidine (Pepcid) 20 mg tablet Take 1 tablet (20 mg) by mouth once daily.      gabapentin (Neurontin) 100 mg capsule Take 1 capsule (100 mg) by mouth.      ipratropium (Atrovent) 21 mcg (0.03 %) nasal spray Administer 2 sprays into each nostril 3 times a day. 30 mL 11    levothyroxine (Synthroid, Levoxyl) 75 mcg tablet Take 88 mcg by mouth once daily in the morning. Take before meals.      linaCLOtide (Linzess) 290 mcg capsule Take 1 capsule (290 mcg) by mouth once daily in the morning. Take before meals. Do not crush or chew. 90 capsule 3    losartan (Cozaar) 25 mg tablet Take 1 tablet (25 mg) by mouth once daily.      ofloxacin (Ocuflox) 0.3 % ophthalmic solution       omeprazole (PriLOSEC) 20 mg DR capsule Take 1 capsule (20 mg) by mouth 2 times a day. Do not crush or chew. 180 capsule 3    traZODone (Desyrel) 50 mg tablet Take 1 tablet (50 mg) by mouth once daily at bedtime.       No current facility-administered medications for this visit.

## 2025-05-06 NOTE — TELEPHONE ENCOUNTER
Patient called in. Since she was given 24 mcg Amitiza the cost is $360 per month. She is unsure how to proceed.

## 2025-05-07 DIAGNOSIS — K59.09 CHRONIC CONSTIPATION: ICD-10-CM

## 2025-05-07 RX ORDER — LUBIPROSTONE 24 UG/1
24 CAPSULE ORAL
Qty: 60 CAPSULE | Refills: 0 | OUTPATIENT
Start: 2025-05-07

## 2025-05-07 RX ORDER — LUBIPROSTONE 24 UG/1
24 CAPSULE ORAL
Qty: 60 CAPSULE | Refills: 0 | Status: SHIPPED | OUTPATIENT
Start: 2025-05-07 | End: 2025-06-06

## 2025-05-12 ENCOUNTER — TELEPHONE (OUTPATIENT)
Dept: GASTROENTEROLOGY | Facility: CLINIC | Age: 85
End: 2025-05-12
Payer: MEDICARE

## 2025-05-12 NOTE — TELEPHONE ENCOUNTER
Patient called in, stating she was having some side effects of the Amitiza. She only took 1 or 2 doses.     She was having some stuffed ears, shortness of breath.    She has been notified that she can check with her  to see how much a 30 day supply is at [facility].     She will call back and let us know.   She questioned samples and was notified she was given the last at her appointment.

## 2025-05-22 ENCOUNTER — TELEPHONE (OUTPATIENT)
Dept: GASTROENTEROLOGY | Facility: CLINIC | Age: 85
End: 2025-05-22
Payer: MEDICARE

## 2025-05-22 NOTE — TELEPHONE ENCOUNTER
Patient no longer taking Amitiza . She states it gave her severe side effects. She has found Linzess to be cheaper if she gets it from [facility] - $400 for 3 month supply. She would need a physical prescription  printed out. She is aware Dr. Mccauley is out of the office until next week. She is using up the rest of her samples until then.

## 2025-05-29 DIAGNOSIS — K59.09 CHRONIC CONSTIPATION: ICD-10-CM

## 2025-06-18 ENCOUNTER — APPOINTMENT (OUTPATIENT)
Dept: GASTROENTEROLOGY | Facility: CLINIC | Age: 85
End: 2025-06-18
Payer: COMMERCIAL

## 2025-08-24 DIAGNOSIS — K21.9 GASTROESOPHAGEAL REFLUX DISEASE, UNSPECIFIED WHETHER ESOPHAGITIS PRESENT: ICD-10-CM

## 2025-08-25 DIAGNOSIS — R09.81 NASAL CONGESTION: ICD-10-CM

## 2025-08-25 DIAGNOSIS — R09.82 POST-NASAL DRAINAGE: ICD-10-CM

## 2025-08-25 DIAGNOSIS — R44.8 FACIAL PRESSURE: ICD-10-CM

## 2025-08-25 RX ORDER — OMEPRAZOLE 20 MG/1
20 CAPSULE, DELAYED RELEASE ORAL
Qty: 180 CAPSULE | Refills: 3 | Status: SHIPPED | OUTPATIENT
Start: 2025-08-25 | End: 2026-08-25

## 2025-08-26 RX ORDER — DOXYCYCLINE 100 MG/1
100 TABLET ORAL 2 TIMES DAILY
Qty: 14 TABLET | Refills: 0 | Status: SHIPPED | OUTPATIENT
Start: 2025-08-26 | End: 2025-09-02

## 2025-09-10 ENCOUNTER — APPOINTMENT (OUTPATIENT)
Dept: OTOLARYNGOLOGY | Facility: CLINIC | Age: 85
End: 2025-09-10
Payer: COMMERCIAL

## 2025-09-17 ENCOUNTER — APPOINTMENT (OUTPATIENT)
Facility: CLINIC | Age: 85
End: 2025-09-17
Payer: MEDICARE

## 2025-11-11 ENCOUNTER — APPOINTMENT (OUTPATIENT)
Dept: GASTROENTEROLOGY | Facility: CLINIC | Age: 85
End: 2025-11-11
Payer: MEDICARE

## 2025-11-12 ENCOUNTER — APPOINTMENT (OUTPATIENT)
Facility: CLINIC | Age: 85
End: 2025-11-12
Payer: COMMERCIAL